# Patient Record
Sex: FEMALE | Race: WHITE | URBAN - METROPOLITAN AREA
[De-identification: names, ages, dates, MRNs, and addresses within clinical notes are randomized per-mention and may not be internally consistent; named-entity substitution may affect disease eponyms.]

---

## 2017-01-05 ENCOUNTER — OFFICE VISIT (OUTPATIENT)
Dept: PSYCHOLOGY | Facility: CLINIC | Age: 22
End: 2017-01-05

## 2017-01-05 DIAGNOSIS — E66.01 PSYCHOLOGICAL FACTORS AFFECTING MORBID OBESITY (H): ICD-10-CM

## 2017-01-05 DIAGNOSIS — F41.1 GENERALIZED ANXIETY DISORDER: Primary | ICD-10-CM

## 2017-01-05 DIAGNOSIS — F54 PSYCHOLOGICAL FACTORS AFFECTING MORBID OBESITY (H): ICD-10-CM

## 2017-01-06 NOTE — PROGRESS NOTES
"Health Psychology                                      Department of Medicine                                           [facility] Mail Code 747    Aimee Massey, Ph.D., L.P. [phone]  [address] [place] [address] Deshawn Kwok, Ph.D.,  L.P. [phone]  [place] [address]  Ronn Cat, Ph.D., EDGAR.BETSY.P.P., L.P. [phone]   ________________________________________________________________________________________________  Health Psychology - Follow up Visit  Confidential Summary*    REFERRAL SOURCE  Aime Fox MD    CHIEF COMPLAINT/REASON FOR VISIT  Cognitive behavioral therapy and behavioral counseling in context of health and behavior issues related to weight loss and upcoming weight loss surgery/gastric sleeve surgery.       Patient was seen today for a 60 minute individual health and behavior intervention session.    Subjective:  Patient seen for individual session.  Todays weight 261.7, a loss of over 5 pounds. Her surgery goal weight is 260 with clothes.  She reported that she has been diligent about recording her dietary intake using \"my plate\".  She reported that she is aware that her portion sizes have been large and that she often ate and then forgot that she had.  She is moving to having 3 oz of meat (size of her palm) and filling her plate with vegetables.  She has had several visits with dietician and reports improved knowledge.      She reported that she and her boyfriend have discussed the possibility that she may receive extra attention following surgery and he reassured her that as long as her behavior does not change, that he will be able to adjust.  Discussed his own weight loss surgery and the fact that he went from 493 down to 250.  He is currently at 270 and is hoping that her focus on weight loss will encourage him to lose further.  He is a  who is often on the road for a week at a time.  He has begun " snacking as he reports having difficulty finding healthy eating on the road.      Patient learned that she will be having her surgery with Dr. Fox, same surgeon her bf had.  She is excited.  Although she was given the option of an earlier date, she is planning to ask that the surgery be done during her academic break in March.  Her bf is going to take the week off and her mom is going to move down and stay with them for the week or longer if necessary.      She endorsed some awareness of emotional eating and reported a chinese food craving right after her bf left for his week long trip.  She reported that she was aware that this was triggered by her emotions of loss at his departure.  She is aware that she will need to identify other coping tools in order to be successful both short term and after surgery.      Patients mother has a brain aneurism when patient age 15, discussed long history of anxiety and worry.      She is hoping to lose 100 pounds with surgery, to get to a healthy weight of 150 for her 5'6 frame.      Discussed assertiveness communication but patients colleague has not been at work.      Objective:  Patient was on time for today s session, appropriately groomed and dressed, and demonstrated good eye contact.  She appeared friendly, alert and oriented.  Mood was euthymic, with appropriate range of affect. Patient denied suicidal or assaultive ideation, plan, or intent.        Assessment:  The patient has a longstanding history of obesity.  She continues to prepare herself for bariatric surgery.  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      Plan:  With continued guidance and education from her health care team, there are no major concerns from a psychological standpoint, and patient is probably a good candidate for weight loss surgery. Will see in 1 week for continued support and education surrounding weight loss and  anxiety management strategies.    Time In: 10:00  Time Out:  11:00    Diagnosis:  Axis I ROGERIO, psych factors affecting obesity    Axis II Deferred   Axis III Obesity (278.00), please see medical records for details   [place] IV Psychosocial and Environmental Stressors:Health & school     Yuliya Staton, Ph.D., L.P.      *In accordance with the Rules of the [place] [employer], it is noted that psychological descriptions and scientific procedures underlying psychological evaluations have limitations.  Absolute predictions cannot be made based on information in this report.

## 2017-01-26 ENCOUNTER — OFFICE VISIT (OUTPATIENT)
Dept: PSYCHOLOGY | Facility: CLINIC | Age: 22
End: 2017-01-26

## 2017-01-26 ENCOUNTER — OFFICE VISIT (OUTPATIENT)
Dept: SURGERY | Facility: CLINIC | Age: 22
End: 2017-01-26

## 2017-01-26 VITALS
HEART RATE: 74 BPM | SYSTOLIC BLOOD PRESSURE: 134 MMHG | DIASTOLIC BLOOD PRESSURE: 87 MMHG | OXYGEN SATURATION: 99 % | TEMPERATURE: 98.5 F | BODY MASS INDEX: 41.53 KG/M2 | HEIGHT: 66 IN | WEIGHT: 258.4 LBS

## 2017-01-26 DIAGNOSIS — E66.01 PSYCHOLOGICAL FACTORS AFFECTING MORBID OBESITY (H): Primary | ICD-10-CM

## 2017-01-26 DIAGNOSIS — F54 PSYCHOLOGICAL FACTORS AFFECTING MORBID OBESITY (H): Primary | ICD-10-CM

## 2017-01-26 DIAGNOSIS — E66.01 MORBID OBESITY DUE TO EXCESS CALORIES (H): Primary | ICD-10-CM

## 2017-01-26 DIAGNOSIS — F41.1 GAD (GENERALIZED ANXIETY DISORDER): ICD-10-CM

## 2017-01-26 ASSESSMENT — ENCOUNTER SYMPTOMS
EXERCISE INTOLERANCE: 0
SNORES LOUDLY: 0
SPEECH CHANGE: 0
SYNCOPE: 0
WEIGHT GAIN: 0
HYPOTENSION: 0
DECREASED LIBIDO: 0
SHORTNESS OF BREATH: 0
DECREASED APPETITE: 0
NIGHT SWEATS: 0
HYPERTENSION: 0
TASTE DISTURBANCE: 0
LOSS OF CONSCIOUSNESS: 0
WEIGHT LOSS: 0
SPUTUM PRODUCTION: 0
PARALYSIS: 0
SORE THROAT: 0
CLAUDICATION: 0
DIZZINESS: 0
DYSPNEA ON EXERTION: 0
FEVER: 0
SLEEP DISTURBANCES DUE TO BREATHING: 0
LIGHT-HEADEDNESS: 0
COUGH DISTURBING SLEEP: 0
SINUS PAIN: 0
HEMATURIA: 0
EYE REDNESS: 0
TINGLING: 0
DYSURIA: 0
LEG PAIN: 0
INCREASED ENERGY: 0
WEAKNESS: 0
NAIL CHANGES: 0
ALTERED TEMPERATURE REGULATION: 0
NUMBNESS: 0
SKIN CHANGES: 0
HALLUCINATIONS: 0
DISTURBANCES IN COORDINATION: 0
EYE PAIN: 0
TROUBLE SWALLOWING: 0
ORTHOPNEA: 0
PALPITATIONS: 0
SMELL DISTURBANCE: 0
DIFFICULTY URINATING: 0
POLYDIPSIA: 0
DOUBLE VISION: 0
HEMOPTYSIS: 0
HEADACHES: 0
WHEEZING: 0
COUGH: 0
SINUS CONGESTION: 0
EYE IRRITATION: 0
CHILLS: 0
EXTREMITY NUMBNESS: 0
TACHYCARDIA: 0
NECK MASS: 0
LEG SWELLING: 0
POLYPHAGIA: 0
HOT FLASHES: 0
POSTURAL DYSPNEA: 0
FATIGUE: 0
HOARSE VOICE: 0
FLANK PAIN: 0
EYE WATERING: 0
SEIZURES: 0
POOR WOUND HEALING: 0
MEMORY LOSS: 0
RESPIRATORY PAIN: 0
TREMORS: 0

## 2017-01-26 NOTE — NURSING NOTE
"(   Chief Complaint   Patient presents with     RECHECK     Needs pre op teaching Goal 260 #    )    ( Weight: 258 lb 6.4 oz )  ( Height: 5' 5.5\" )  ( BMI (Calculated): 42.43 )  ( Seminar Weight: 270 lb 3.2 oz )  ( Seminar Wt Minus Current Wt (lbs): 11.8 )  ( Last Visits Weight: 270 lb 3.2 oz )  ( Change from Last Visit Weight (lbs): -11.8 )  (   )  (   )    ( BP: 134/87 mmHg )  ( Site: Arm, upper left )  ( Temp: 98.5  F (36.9  C) )  ( Temp src: Oral )  ( Pulse: 74 )  (   )  ( SpO2: 99 % )    (   Patient Active Problem List   Diagnosis     Obesity     Elevated blood pressure reading without diagnosis of hypertension     Mild major depression (H)     History of methamphetamine abuse     Anxiety     Back pain    )  (   Current Outpatient Prescriptions   Medication Sig Dispense Refill     norgestim-eth estrad triphasic (TRINESSA, 28,) 0.18/0.215/0.25 MG-35 MCG TABS tablet Take 1 tablet by mouth daily 3 Package 3    )  ( Diabetes Eval:    )    ( Pain Eval:  Data Unavailable )    ( Wound Eval:       )    (   History   Smoking status     Never Smoker    Smokeless tobacco     Never Used    )    ( Signed By:  Fernando Evans; January 26, 2017; 2:07 PM )    "

## 2017-01-26 NOTE — Clinical Note
2017       RE: Agatha Alejandro  [address] [place] [id number]     Dear Colleague,    Thank you for referring your patient, Agatha Alejandro, to the [facility] SURGICAL [facility]NA[facility]EMENT at [facility]. Please see a copy of my visit note below.          Pre-Bariatric Surgery Note    Makayla Campos    Date: 2017     RE: Agatha Alejandro    MR#: [id number]   : 1995   Date of Visit: 2017    REASON FOR VISIT: Preoperative evaluation for possible weight loss surgery    Dear Dr. Campos,    I had the pleasure of seeing your patient, Agatha Alejandro, in my preoperative bariatric clinic.    As you know, she is morbidly obese and considering weight loss surgery to treat obesity in association with her medical conditions of obesity.  Her consult weight was 270.  She has lost 13 pounds since her consult weight. She has met her required pre-surgery weight.    Most recent weights:  Wt Readings from Last 4 Encounters:   17 258 lb 6.4 oz   16 267 lb 9.6 oz   16 270 lb 3.2 oz   13 255 lb 4.7 oz (99.48 %*)     * Growth percentiles are based on CDC 2-20 Years data.       Please refer to initial consult note from date 16 for patient's weight history and co-morbidities.    Review of Systems     Constitutional:  Negative for fever, chills, weight loss, weight gain, fatigue, decreased appetite, night sweats, recent stressors, height gain, height loss, post-operative complications, incisional pain, hallucinations, increased energy, hyperactivity and confused.   HENT:  Negative for ear pain, hearing loss, tinnitus, nosebleeds, trouble swallowing, hoarse voice, mouth sores, sore throat, ear discharge, tooth pain, gum tenderness, taste disturbance, smell disturbance, hearing aid, bleeding gums, dry mouth, sinus pain, sinus congestion and neck mass.    Eyes:  Negative for double vision, pain, redness, eye pain, decreased vision, eye watering,  eye bulging, eye dryness, flashing lights, spots, floaters, strabismus, tunnel vision, jaundice and eye irritation.   Respiratory:   Negative for cough, hemoptysis, sputum production, shortness of breath, wheezing, sleep disturbances due to breathing, snores loudly, respiratory pain, dyspnea on exertion, cough disturbing sleep and postural dyspnea.    Cardiovascular:  Negative for chest pain, dyspnea on exertion, palpitations, orthopnea, claudication, leg swelling, fingers/toes turn blue, hypertension, hypotension, syncope, history of heart murmur, chest pain on exertion, chest pain at rest, pacemaker, few scattered varicosities, leg pain, sleep disturbances due to breathing, tachycardia, light-headedness, exercise intolerance and edema.   Genitourinary:  Negative for bladder incontinence, dysuria, urgency, hematuria, flank pain, vaginal discharge, difficulty urinating, genital sores, dyspareunia, decreased libido, nocturia, voiding less frequently, arousal difficulty, abnormal vaginal bleeding, excessive menstruation, menstrual changes, hot flashes, vaginal dryness and postmenopausal bleeding.   Skin:  Negative for nail changes, itching, poor wound healing, rash, hair changes, skin changes, acne, warts, poor wound healing, scarring, flaky skin, Raynaud's phenomenon, sensitivity to sunlight and skin thickening.   Neurological:  Negative for dizziness, tingling, tremors, speech change, seizures, loss of consciousness, weakness, light-headedness, numbness, headaches, disturbances in coordination, extremity numbness, memory loss, difficulty walking and paralysis.   Psychiatric/Behavioral:  Negative for hallucinations and memory loss.    Endocrine:  Negative for altered temperature regulation, polyphagia, polydipsia, unwanted hair growth and change in facial hair.      Past Medical History   Diagnosis Date     History of methamphetamine abuse 2013     3 weeks of use.  Went to rehab       Past Surgical History  "  Procedure Laterality Date     Tonsillectomy  age [age]     C dental bitewing single film  age 14     wisdom teeth     Rw skeletal/muscul (abstracted)  age 12     left knee:  ACL and oher tendon surgery       Current Outpatient Prescriptions   Medication     norgestim-eth estrad triphasic (TRINESSA, 28,) 0.18/0.215/0.25 MG-35 MCG TABS tablet     No current facility-administered medications for this visit.       Allergies   Allergen Reactions     Penicillins Hives       PHYSICAL EXAMINATION:  /87 mmHg  Pulse 74  Temp(Src) 98.5  F (36.9  C) (Oral)  Ht 5' 5.5\"  Wt 258 lb 6.4 oz  BMI 42.33 kg/m2  SpO2 99%   Body mass index is 42.33 kg/(m^2).   NAD NCAT  Respiratory: breathing unlabored  Abdomen: obese, soft/nt/nd    Special testing: not indicated    We reviewed the choice of surgery and she would like to undergo laparoscopic sleeve gastrectomy surgery.    We reviewed the risks of surgery related to the procedure.    Sleeve Gastrectomy: Risks and Side Effects    The complications or risks of surgery include but are not limited to: death, heart attack, infection in the surgical site (wound infection), abdomen (abscess), bladder (urinary tract infection), lungs (pneumonia), clots in legs (deep vein thrombosis) or lungs (pulmonary emboli),  injury to the bowels or other organs, bowel obstruction, hernia at the incision and gastrointestional bleeding.    More specific risks related to vertical sleeve gastrectomy were detailed at the bariatric informational seminar and include the following: leak at the vertical sleeve staple line, leak at the anastomoses,  nausea, vomiting, and dehydration for several months,  adhesions causing bowel obstruction, rapid weight loss causing a higher rate of gallstone formation during the first 6 months after surgery, decreased absorption of vitamins and protein because of the reduced stomach size, weight regain if inappropriate food intake occurs, stricture, injury to other organs, " hernia,  and ulcers.       Side effects of bariatric surgery include but are not limited to: abdominal pain, cramping, bloating, constipation, nausea, vomiting, diarrhea, difficulty swallowing,  dehydration, hair loss, excess skin, protein, iron and vitamin deficiencies, heartburn, transfer of addictions, increased anxiety and worsening depression.       I emphasized exercise and activity behavior along with appropriate food choice as the main foundation for weight loss with surgery providing surgical reinforcement of the appropriate behavior set.    PLAN:  Will plan for laparoscopic sleeve gastrectomy for this patient to be schedule based on availability. The patient has expressed understanding with the risks and benefits and has completed the appropriate steps pre-operatively.     Review of general surgery weight loss process    1. Complete preoperative requirements, including weight loss.  Final weight check to confirm MANDATORY weight loss requirement must be documented on a clinic scale.    2. Discuss prior authorization with .    3. History and physical evaluation by PCP of PAC clinic within 30 days of surgery date, preoperative class, and weight check (weigh-in visit) to be scheduled by patient.  Pre-anesthesia clinic for risk evaluation to be scheduled by anesthesia clinic.    4. We cannot guarantee that patient will qualify for surgery unless all preoperative requirements are met, prior authorization from primary insurance company is granted, and insurance changes do not occur.    5. It is possible for patients to regain all weight after weight loss surgery unless they follow guidelines prescribed by our bariatric center.    6. All patients with gastrointestinal complaints after weight loss surgery must have complaints conveyed to the bariatric team for appropriate treatment.    7. Vitamin deficiencies may develop post-bariatric surgery and annual laboratory testing should be  performed.    8. Persistent nausea/vomiting after bariatric surgery entails risk of thiamine deficiency and should be treated early.  Vitamin B12 deficiency may develop, especially after gastric bypass surgery and must be recognized.        If you have any questions about our plans please don't hesitate to contact me.    Sincerely,    Aime Fox MD  Surgery  [phone] (hospital )  [phone] (clinic nurses)    I spent a total of 30 minutes face to face with Agatha Alejandro during today's office visit.  Over 50% of this time was spent counseling the patient and/or coordinating care.

## 2017-01-26 NOTE — PROGRESS NOTES
Pre-Bariatric Surgery Note    Makayla Campos    Date: 2017     RE: Agatha Alejandro    MR#: [id number]   : 1995   Date of Visit: 2017    REASON FOR VISIT: Preoperative evaluation for possible weight loss surgery    Dear Dr. Campos,    I had the pleasure of seeing your patient, Agatha Alejandro, in my preoperative bariatric clinic.    As you know, she is morbidly obese and considering weight loss surgery to treat obesity in association with her medical conditions of obesity.  Her consult weight was 270.  She has lost 13 pounds since her consult weight. She has met her required pre-surgery weight.    Most recent weights:  Wt Readings from Last 4 Encounters:   17 258 lb 6.4 oz   16 267 lb 9.6 oz   16 270 lb 3.2 oz   13 255 lb 4.7 oz (99.48 %*)     * Growth percentiles are based on [facility] 2-20 Years data.       Please refer to initial consult note from date 16 for patient's weight history and co-morbidities.    Review of Systems     Constitutional:  Negative for fever, chills, weight loss, weight gain, fatigue, decreased appetite, night sweats, recent stressors, height gain, height loss, post-operative complications, incisional pain, hallucinations, increased energy, hyperactivity and confused.   HENT:  Negative for ear pain, hearing loss, tinnitus, nosebleeds, trouble swallowing, hoarse voice, mouth sores, sore throat, ear discharge, tooth pain, gum tenderness, taste disturbance, smell disturbance, hearing aid, bleeding gums, dry mouth, sinus pain, sinus congestion and neck mass.    Eyes:  Negative for double vision, pain, redness, eye pain, decreased vision, eye watering, eye bulging, eye dryness, flashing lights, spots, floaters, strabismus, tunnel vision, jaundice and eye irritation.   Respiratory:   Negative for cough, hemoptysis, sputum production, shortness of breath, wheezing, sleep disturbances due to breathing, snores loudly, respiratory pain, dyspnea on  exertion, cough disturbing sleep and postural dyspnea.    Cardiovascular:  Negative for chest pain, dyspnea on exertion, palpitations, orthopnea, claudication, leg swelling, fingers/toes turn blue, hypertension, hypotension, syncope, history of heart murmur, chest pain on exertion, chest pain at rest, pacemaker, few scattered varicosities, leg pain, sleep disturbances due to breathing, tachycardia, light-headedness, exercise intolerance and edema.   Genitourinary:  Negative for bladder incontinence, dysuria, urgency, hematuria, flank pain, vaginal discharge, difficulty urinating, genital sores, dyspareunia, decreased libido, nocturia, voiding less frequently, arousal difficulty, abnormal vaginal bleeding, excessive menstruation, menstrual changes, hot flashes, vaginal dryness and postmenopausal bleeding.   Skin:  Negative for nail changes, itching, poor wound healing, rash, hair changes, skin changes, acne, warts, poor wound healing, scarring, flaky skin, Raynaud's phenomenon, sensitivity to sunlight and skin thickening.   Neurological:  Negative for dizziness, tingling, tremors, speech change, seizures, loss of consciousness, weakness, light-headedness, numbness, headaches, disturbances in coordination, extremity numbness, memory loss, difficulty walking and paralysis.   Psychiatric/Behavioral:  Negative for hallucinations and memory loss.    Endocrine:  Negative for altered temperature regulation, polyphagia, polydipsia, unwanted hair growth and change in facial hair.      Past Medical History   Diagnosis Date     History of methamphetamine abuse 2013     3 weeks of use.  Went to rehab       Past Surgical History   Procedure Laterality Date     Tonsillectomy  age [age]     C dental bitewing single film  age 14     wisdom teeth     Rw skeletal/muscul (abstracted)  age 12     left knee:  ACL and oher tendon surgery       Current Outpatient Prescriptions   Medication     norgestim-eth estrad triphasic (TRINESSA, 28,)  "0.18/0.215/0.25 MG-35 MCG TABS tablet     No current facility-administered medications for this visit.       Allergies   Allergen Reactions     Penicillins Hives       PHYSICAL EXAMINATION:  /87 mmHg  Pulse 74  Temp(Src) 98.5  F (36.9  C) (Oral)  Ht 5' 5.5\"  Wt 258 lb 6.4 oz  BMI 42.33 kg/m2  SpO2 99%   Body mass index is 42.33 kg/(m^2).   NAD NCAT  Respiratory: breathing unlabored  Abdomen: obese, soft/nt/nd    Special testing: not indicated    We reviewed the choice of surgery and she would like to undergo laparoscopic sleeve gastrectomy surgery.    We reviewed the risks of surgery related to the procedure.    Sleeve Gastrectomy: Risks and Side Effects    The complications or risks of surgery include but are not limited to: death, heart attack, infection in the surgical site (wound infection), abdomen (abscess), bladder (urinary tract infection), lungs (pneumonia), clots in legs (deep vein thrombosis) or lungs (pulmonary emboli),  injury to the bowels or other organs, bowel obstruction, hernia at the incision and gastrointestional bleeding.    More specific risks related to vertical sleeve gastrectomy were detailed at the bariatric informational seminar and include the following: leak at the vertical sleeve staple line, leak at the anastomoses,  nausea, vomiting, and dehydration for several months,  adhesions causing bowel obstruction, rapid weight loss causing a higher rate of gallstone formation during the first 6 months after surgery, decreased absorption of vitamins and protein because of the reduced stomach size, weight regain if inappropriate food intake occurs, stricture, injury to other organs, hernia,  and ulcers.       Side effects of bariatric surgery include but are not limited to: abdominal pain, cramping, bloating, constipation, nausea, vomiting, diarrhea, difficulty swallowing,  dehydration, hair loss, excess skin, protein, iron and vitamin deficiencies, heartburn, transfer of " addictions, increased anxiety and worsening depression.       I emphasized exercise and activity behavior along with appropriate food choice as the main foundation for weight loss with surgery providing surgical reinforcement of the appropriate behavior set.    PLAN:  Will plan for laparoscopic sleeve gastrectomy for this patient to be schedule based on availability. The patient has expressed understanding with the risks and benefits and has completed the appropriate steps pre-operatively.     Review of general surgery weight loss process    1. Complete preoperative requirements, including weight loss.  Final weight check to confirm MANDATORY weight loss requirement must be documented on a clinic scale.    2. Discuss prior authorization with .    3. History and physical evaluation by PCP of PAC clinic within 30 days of surgery date, preoperative class, and weight check (weigh-in visit) to be scheduled by patient.  Pre-anesthesia clinic for risk evaluation to be scheduled by anesthesia clinic.    4. We cannot guarantee that patient will qualify for surgery unless all preoperative requirements are met, prior authorization from primary insurance company is granted, and insurance changes do not occur.    5. It is possible for patients to regain all weight after weight loss surgery unless they follow guidelines prescribed by our bariatric center.    6. All patients with gastrointestinal complaints after weight loss surgery must have complaints conveyed to the bariatric team for appropriate treatment.    7. Vitamin deficiencies may develop post-bariatric surgery and annual laboratory testing should be performed.    8. Persistent nausea/vomiting after bariatric surgery entails risk of thiamine deficiency and should be treated early.  Vitamin B12 deficiency may develop, especially after gastric bypass surgery and must be recognized.        If you have any questions about our plans please don't hesitate  to contact me.    Sincerely,    Aime Fox MD  Surgery  [phone] (hospital )  [phone] (clinic nurses)                  I spent a total of 30 minutes face to face with Agatha Alejandro during today's office visit.  Over 50% of this time was spent counseling the patient and/or coordinating care.

## 2017-01-26 NOTE — NURSING NOTE
This patient is having Lap Sleeve Gastrectomy by Dr. Fox.    The following handouts were reviewed with the patient :  Before Your Surgery, Patient Checklist, Weight Loss Surgery Pre-operative Class, Preop Recommendations Quick Reference Guide, History and Physical, Blood Bank Preadmission Order, Medications to Avoid, Shower or Bathing Before Surgery, Bowel Preparation, Powerful Choices and [place] [employer] Directive.  Questions were addressed and understanding of content was verbalized.  Contact information was provided.    Patient goal weight: 260 #  Weight today: 258 #  Class: Done  PAC: TBD  RD: To do Monday  H&P: TBD  Labs: TBD    Time with pt - 15 min    Megan Balderas RN

## 2017-01-30 ENCOUNTER — ALLIED HEALTH/NURSE VISIT (OUTPATIENT)
Dept: SURGERY | Facility: CLINIC | Age: 22
End: 2017-01-30

## 2017-01-30 NOTE — PROGRESS NOTES
"Bariatric Nutrition Consultation Note    Reason For Visit: Nutrition Reassessment    Agatha Alejandro is a 21 year old presenting today for a follow-up bariatric nutrition consult.  Pt is interested in laparoscopic sleeve gastrectomy.  This is pt's 3rd of 3 RD visits required. Pt referred by KE Pitt.        Support System Reviewed With Patient  11/8/2016    Do you feel you have adequate support from family and/or friends before and after surgery?  No    *Update (1/30/17): Pt stated today that she does indeed have the support of her family, boyfriend, and friends.       ANTHROPOMETRICS:  Initial Estimated body mass index is 46.70 kg/(m^2) as calculated from the following:    Height as of 8/30/13: 1.62 m (5' 3.78\").    Initial Weight as of an earlier encounter on 11/18/16: 270.2 lbs  Current weight: 259.3 lbs *Goal Met/Surpassed*   Weight Change: -8.3 lbs over the past month; - 10.9 lbs from initial weight    Required weight loss goal pre-op: -10 lbs from initial consult weight (goal weight 260.2 lbs or less before surgery)     NUTRITION HISTORY:  Food Allergies/Intolerances: none.  Cravings: Salty foods (soy sauce); chinese foods.  Triggers/cues causing extra eating: stress  Supplementation: MVI daily    Progress With Previous Goals:  Relating To Eating: - Pt has been following the modified liquid diet 75% of the time.  She did have pizza and cake at a birthday party, but went right back to the diet afterwards.   -Try the Modified Liquid Diet for weight loss:  Breakfast: Protein Shake  Lunch: Protein Shake  Supper: 3 oz lean protein + non-starchy vegetables  Snack: non-starchy vegetables (no calorie-containing dips/condiments)  Beverages: at least 48-64 oz water between meals daily  *Protein Shake Criteria: ~200 Calories, at least 20 grams of protein, and less than 10 grams of sugar    -Eat slowly (20-30 minutes per meal), chewing foods well (25 chews per bite/applesauce consistency).- Meeting.   - 9\" Plate " method (1/2 plate non-starchy vegetables/fruit, 1/4 plate lean protein, 1/4 plate whole grain starch - no more than 1/2 cup carb/meal) and Focus on lean protein and non-starchy vegetables/whole fruit at each meal with no more than 1 cup of carbs or 2 slices of bread- Meeting.     Relating to beverages:  - Continue separate fluids from meals by 30 minutes before, during, and after eating.- Meeting.   - Continue drinking at least 64oz of water daily. - Meeting.     Relating to dietary supplements:  - Continue taking a multivitamin containing iron daily.- Meeting.     Relating to activity:  - Continue walking for 30-45 minutes 3 days/week. - Meeting.         NUTRITION DIAGNOSIS:  Previous (continues): Obesity r/t long history of self-monitoring deficit and excessive energy intake aeb BMI >30. - continues, improving.  New added: Food- and Nutrition-related knowledge deficit r/t lack of prior exposure to information AEB pt unable to verbalize main points of bariatric clear and low-fat full liquid diet.    INTERVENTION:  Intervention Provided/Education Provided:  Praised Pt on weight lost.  Reviewed goals. Encouraged Pt to continue the Modified Liquid Diet for rapid weight loss.  Provided instruction on bariatric clear and low-fat full liquid diets.  Provided the following handouts: Diet Guidelines for Bariatric Surgery, Sources of Protein, Keeping Track of Your Fluids, list of recommended vitamin/mineral supplementation after SG surgery and RD contact information.   Patient Understanding: good  Expected Compliance: good       PRE-OP GOALS:  Relating To Eating:  -Try the Modified Liquid Diet for weight loss:  Breakfast: Protein Shake  Lunch: Protein Shake  Supper: 3 oz lean protein + non-starchy vegetables  Snack: non-starchy vegetables (no calorie-containing dips/condiments)  Beverages: at least 48-64 oz water between meals daily  *Protein Shake Criteria: ~200 Calories, at least 20 grams of protein, and less than 10  "grams of sugar    -Eat slowly (20-30 minutes per meal), chewing foods well (25 chews per bite/applesauce consistency).  - 9\" Plate method (1/2 plate non-starchy vegetables/fruit, 1/4 plate lean protein, 1/4 plate whole grain starch - no more than 1/2 cup carb/meal) and Focus on lean protein and non-starchy vegetables/whole fruit at each meal with no more than 1 cup of carbs or 2 slices of bread    Relating to beverages:  - Continue separate fluids from meals by 30 minutes before, during, and after eating.  - Continue drinking at least 64oz of water daily.     Relating to dietary supplements:  - Continue taking a multivitamin containing iron daily.    Relating to activity:  - Continue walking for 30-45 minutes 3 days/week.     POST-OP GOALS  1. Follow the bariatric post-op diet advancement schedule:  - Bariatric clear liquid diet through post-op day 1 (while in the hospital).  - Bariatric low-fat full liquid diet on post-op days 2 through 13 (2 weeks).  2. Sip on 48-64 oz (or greater) fluids daily, recording intake to help stay on-track.  - Drink at least 2 oz of fluid every 30 min.    Follow-Up: 1 month or PRN    Time spent with patient: 30 minutes.    Lilo Rico, RD, LD, CLT  Pager: [phone]            "

## 2017-01-30 NOTE — PROGRESS NOTES
Health Psychology                                      Department of Medicine                                           [facility] Mail Code 741    Aimee Massey, Ph.D., L.P. [phone]  [address] [place] [address], [facility] Deshawn Kwok, Ph.D.,  L.P. [phone]  [place] [address]  Ronn Cat, Ph.D., A.B.P.P., L.P. [phone]       Yuliya Staton, PhD, LP [phone]  ________________________________________________________________________________________________  Health Psychology - Follow up Visit  Confidential Summary*    REFERRAL SOURCE  Aime Fox MD    CHIEF COMPLAINT/REASON FOR VISIT  Cognitive behavioral therapy and behavioral counseling in context of health and behavior issues related to weight loss and upcoming weight loss surgery/gastric sleeve surgery.       Patient was seen today for a 60 minute individual health and behavior intervention session.    TX PLAN FINISHED AND SIGNED TODAY    Subjective:  Patient seen for individual session.  Todays weight 257, a loss of aporoximately 5 pounds since last session and 13 pounds overall. She reported that she has recently changed her work hours and is finding that working more consistent hours is helpful in planning her meals.  She needs to wake up by 4 in order to complete her morning routine and be at work at 6.  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  She is currently paying off school and is hoping that she will be admitted into a nursing program via [employer].  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  She reported that she has been recommended for a program in which [employer] will pay for her school expenses.      She has told both of her parents about her intention to have bariatric surgery and reported that they were appropriately concerned but supportive.  She also informed  "them that she realizes that she learned a lot of negative eating patterns from them and is aware that they consume high calorie foods and large portions.  She also described her mothers long history of possible alcohol dependence and the impact her behaviors had on the patient.  She was able to see that her behaviors are parentified and she was confronted on her efforts to change her mother.  Her mom is soon to move in with she and her boyfriend and the patient is hoping that she might help her to lose weight, get more active, drink less and engage more with the patient.  Encouraged her to consider [employer] or [facility] programs to learn more about dynamics of caring for someone else who uses alcohol.      The patient has been having a shake in the morning, yogurt and granola and fruit at lunch and veggies and chicken in the evening.  Her boyfriend also had the sleeve procedure and has gained some weight back.  She reported that he is excited that he will now lose with her.  He is aware that she would like to eat out less and is agreeable to this.  The patient reported that she is enjoying cooking at home and feels that she and boyfriend have more time together when they do not go out for meals.      While in session, she pulled up \"My Plate\" and reported that her daily caloric intake ranges from 1040 - 1903 and tends to be higher when by (Bran) is home.      Discussed plans for a March surgery and has started making plans with her employer to be off for 6 weeks.  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      Objective:  Patient was on time for today s session, appropriately groomed and dressed, and demonstrated good eye contact.  She appeared friendly, alert and oriented.  Mood was euthymic, with appropriate range of affect. Patient denied suicidal or assaultive ideation, plan, or intent.        Assessment:  It appears that patient has consistently made changes in her diet " "and exercise and is aware of emotional eating.  She is \"all clear\" from a psychological perspective.  Encouraged patient to continue to participate in counseling through the surgery and following to improve weight loss outcomes and emotional adjustment.      The patient has a longstanding history of obesity.  She continues to prepare herself for bariatric surgery.      Plan:  With continued guidance and education from her health care team, there are no major concerns from a psychological standpoint, and patient is probably a good candidate for weight loss surgery. Will see in 2 weeks for continued support and education surrounding weight loss and anxiety management strategies.    Time In: 12:00  Time Out:  1:00    Diagnosis:  Axis I ROGERIO, psych factors affecting obesity    Axis II Deferred   Axis III Obesity (278.00), please see medical records for details   [place] IV Psychosocial and Environmental Stressors:Health & school     Yuliya Staton, Ph.D., L.P.      *In accordance with the Rules of the [place] Board of Psychology, it is noted that psychological descriptions and scientific procedures underlying psychological evaluations have limitations.  Absolute predictions cannot be made based on information in this report.       "

## 2017-02-16 ENCOUNTER — ANESTHESIA EVENT (OUTPATIENT)
Dept: SURGERY | Facility: CLINIC | Age: 22
DRG: 621 | End: 2017-02-16
Payer: COMMERCIAL

## 2017-02-17 ENCOUNTER — OFFICE VISIT (OUTPATIENT)
Dept: SURGERY | Facility: CLINIC | Age: 22
End: 2017-02-17

## 2017-02-17 ENCOUNTER — ALLIED HEALTH/NURSE VISIT (OUTPATIENT)
Dept: SURGERY | Facility: CLINIC | Age: 22
End: 2017-02-17

## 2017-02-17 VITALS
RESPIRATION RATE: 16 BRPM | WEIGHT: 259.9 LBS | SYSTOLIC BLOOD PRESSURE: 135 MMHG | DIASTOLIC BLOOD PRESSURE: 82 MMHG | HEART RATE: 73 BPM | TEMPERATURE: 98.2 F | HEIGHT: 66 IN | BODY MASS INDEX: 41.77 KG/M2 | OXYGEN SATURATION: 98 %

## 2017-02-17 DIAGNOSIS — Z98.84 BARIATRIC SURGERY STATUS: ICD-10-CM

## 2017-02-17 DIAGNOSIS — Z98.84 BARIATRIC SURGERY STATUS: Primary | ICD-10-CM

## 2017-02-17 LAB
ALBUMIN UR-MCNC: NEGATIVE MG/DL
APPEARANCE UR: CLEAR
BILIRUB UR QL STRIP: NEGATIVE
COLOR UR AUTO: YELLOW
GLUCOSE UR STRIP-MCNC: NEGATIVE MG/DL
HGB BLD-MCNC: 13.1 G/DL (ref 11.7–15.7)
HGB UR QL STRIP: ABNORMAL
KETONES UR STRIP-MCNC: NEGATIVE MG/DL
LEUKOCYTE ESTERASE UR QL STRIP: NEGATIVE
MUCOUS THREADS #/AREA URNS LPF: PRESENT /LPF
NITRATE UR QL: NEGATIVE
PH UR STRIP: 5 PH (ref 5–7)
RBC #/AREA URNS AUTO: 2 /HPF (ref 0–2)
SP GR UR STRIP: 1.02 (ref 1–1.03)
SQUAMOUS #/AREA URNS AUTO: <1 /HPF (ref 0–1)
TSH SERPL DL<=0.005 MIU/L-ACNC: 0.93 MU/L (ref 0.4–4)
URN SPEC COLLECT METH UR: ABNORMAL
UROBILINOGEN UR STRIP-MCNC: 0 MG/DL (ref 0–2)
WBC #/AREA URNS AUTO: <1 /HPF (ref 0–2)

## 2017-02-17 ASSESSMENT — LIFESTYLE VARIABLES: TOBACCO_USE: 0

## 2017-02-17 NOTE — H&P
Pre-Operative H & P     CC:  Preoperative exam to assess for increased cardiopulmonary risk while undergoing surgery and anesthesia.    Date of Encounter: 2/17/2017  Primary Care Physician:  Makayla Campos Flavia is a 21 year old female who presents for pre-operative H & P in preparation for  Laparoscopic Sleeve Gastrectomy on 2/17/17 by Dr. Fox in treatment of  Morbid obesity. Surgery  at [facility]. History of morbid obesity.  Desires weight loss.  Met goal weight.     Remote anesthesia.  Can't recall details.    No family history of bleeding, clotting disorders or complications with anesthesia.      History is obtained from the patient and electronic health record.     Past Medical History  Past Medical History   Diagnosis Date     Depression      History of methamphetamine abuse 2013     3 weeks of use.  Went to rehab     Morbid obesity (H)        Past Surgical History  Past Surgical History   Procedure Laterality Date     Tonsillectomy  age [age]     C dental bitewing single film  age 14     wisdom teeth     Rw skeletal/muscul (abstracted)  age 12     left knee:  ACL and oher tendon surgery       Hx of Blood transfusions/reactions: no     Hx of abnormal bleeding or anti-platelet use: no    Menstrual history: On menses    Steroid use in the last year: no    Personal or FH with difficulty with Anesthesia:  no    Prior to Admission Medications  Current Outpatient Prescriptions   Medication Sig Dispense Refill     Multiple Vitamins-Minerals (MULTIVITAMIN ADULT PO) Take by mouth every morning       norgestim-eth estrad triphasic (TRINESSA, 28,) 0.18/0.215/0.25 MG-35 MCG TABS tablet Take 1 tablet by mouth daily (Patient taking differently: Take 1 tablet by mouth every morning ) 3 Package 3       Allergies  Allergies   Allergen Reactions     Penicillins Hives       Social History  Social History     Social History     Marital status: Single     Spouse name:  N/A     Number of children: 0     Years of education: 10     Occupational History      [employer]     Social History Main Topics     Smoking status: Never Smoker     Smokeless tobacco: Never Used     Alcohol use No     Drug use: Yes     Special: Methamphetamines      Comment: Meth use in past age [age]     Sexual activity: Yes     Partners: Male     Birth control/ protection: Pill      Comment: Tested 8/19/12      Other Topics Concern     Blood Transfusions No     Caffeine Concern No     1 can pop per day     Hobby Hazards Yes     hunting with protective clothing, basketball - mouthgard, atv with helmet     Sleep Concern No     Stress Concern No     Weight Concern Yes     would like to lose back to 170 lbs     Special Diet Yes     eating less,     Exercise Yes     starting to get physical again, once per week, walking     Bike Helmet No     bicycle     Seat Belt Yes     Self-Exams Not Asked     breast no, skin no, does not wear sunscreen     Social History Narrative    Works at [employer].    Single.  Attends school.  Wants to be a nurse.     Lives with boyfriend    No children    3 brothers - healthy    Parents - mother with history of brain aneurysm    Father with hypertension    No family history of bleeding, clotting disorders or complications with anesthesia.           Family History  Family History   Problem Relation Age of Onset     HEART DISEASE Mother 48     brain anerysm     Hypertension Father      CEREBROVASCULAR DISEASE Maternal Grandmother      CANCER Maternal Grandmother 64     lung     DIABETES Maternal Grandfather      CANCER Maternal Grandfather 76     all over     CANCER Maternal Aunt 48     brain     Lipids No family hx of      Respiratory No family hx of      Thyroid Disease No family hx of        ROS/MED HX  The complete review of systems is negative other than noted in the HPI or here.     ENT/Pulmonary:     (+)AMAN risk factors hypertension, obese, , . .   (-) tobacco use  "  Neurologic:  - neg neurologic ROS     Cardiovascular:     (+) hypertension-range: not on b/p meds, ---. : . . . :. . No previous cardiac testing      (-) CAD, taking anticoagulants/antiplatelets and EASTON   METS/Exercise Tolerance: Comment: 3-4 days per week.  Walks or lifts weights.  Can walk 1-3 miles.   >4 METS   Hematologic:  - neg hematologic  ROS       Musculoskeletal:  - neg musculoskeletal ROS       GI/Hepatic:     (+) GERD Symptomatic,       Renal/Genitourinary:  - ROS Renal section negative       Endo:     (+) Obesity, .      Psychiatric:     (+) psychiatric history depression      Infectious Disease:  - neg infectious disease ROS       Malignancy:      - no malignancy   Other:           Temp: 98.2  F (36.8  C) Temp src: Oral BP: 135/82 Pulse: 73   Resp: 16 SpO2: 98 %         259 lbs 14.4 oz  5' 6\"   Body mass index is 41.95 kg/(m^2).       Physical Exam  Constitutional: Awake, alert, cooperative, no apparent distress, and appears stated age.  Eyes: Pupils equal, round and reactive to light, sclera clear, conjunctiva normal.  HENT: Normocephalic, oral pharynx with moist mucus membranes, good dentition. + thyroidmegaly  Respiratory: Clear to auscultation bilaterally, no crackles or wheezing.  Cardiovascular: Regular rate and rhythm, normal S1 and S2, and no murmur noted.  Carotids +2, no bruits. No edema. Palpable pulses to  DP and PT arteries.   GI: Normal bowel sounds, soft, non-distended, non-tender, no masses palpated, no hepatosplenomegaly.  Morbidly obese thus limited exam.  Lymph/Hematologic: No cervical lymphadenopathy and no supraclavicular lymphadenopathy.  Skin: Warm and dry.    Musculoskeletal: Full ROM of neck. There is no redness, warmth, or swelling of the joints. Gross motor strength is normal.    Neurologic: Awake, alert, oriented to name, place and time. Cranial nerves II-XII are grossly intact. Gait is normal.   Neuropsychiatric: Calm, cooperative. Normal affect.     Labs: (personally " reviewed)  CBC RESULTS: Lab Test        02/17/17 11/18/16                       1000          1433          WBC           --          13.0*         RBC           --          4.85          HGB          13.1         13.6          HCT           --          41.1          MCV           --          85            MCH           --          28.0          MCHC          --          33.1          RDW           --          12.1          PLT           --          443             Lab Results      Component                Value               Date                      NA                       141                 11/18/2016             Lab Results      Component                Value               Date                      POTASSIUM                4.3                 11/18/2016            Lab Results      Component                Value               Date                      CHLORIDE                 106                 11/18/2016            Lab Results      Component                Value               Date                      YAMILA                      9.2                 11/18/2016            Lab Results      Component                Value               Date                      CO2                      26                  11/18/2016            Lab Results      Component                Value               Date                      BUN                      15                  11/18/2016            Lab Results      Component                Value               Date                      CR                       0.86                11/18/2016            Lab Results      Component                Value               Date                      GLC                      88                  11/18/2016              TSH:  0.9    Lab Test        02/17/17                       1012          COLOR        Yellow        APPEARANCE   Clear         URINEGLC     Negative      URINEBILI    Negative      URINEKETONE  Negative      SG           1.024          UBLD         Small*        URINEPH      5.0           PROTEIN      Negative      NITRITE      Negative      LEUKEST      Negative      RBCU         2           (on menses)  WBCU         <1              ASSESSMENT and PLAN  Agatha Alejandro is a 21 year old female scheduled to undergo Laparoscopic Sleeve Gastrectomy on 2/17/17 by Dr. Fox in treatment of  Morbid obesity.  PAC referral for risk assessment and optimization for anesthesia with comorbid conditions of:    Pre-operative considerations:  1.  Cardiac:  Functional status very good.  0.4%  risk of major adverse cardiac event.  Low risk surgery.  No further cardiac evaluation needed per 2014 ACC/AHA guidelines for non-cardiac surgery.  2.  Pulm:  Airway feasible.  AMAN risk:  Intermediate.  Non-smoker.  No pulmonary issues.   3.  GI:  Risk of PONV score = 2.  If > 2, anti-emetic intervention recommended.  Intermittent GERD but much improved after quit drinking soda.   4.  Psych:  Depression, intol of meds  5.  2/17/17:  On menses.     VTE risk: 0.5%    Patient is optimized and is acceptable candidate for the proposed procedure.  No further diagnostic evaluation is needed.     Patient was discussed with Dr Ayala.    Amelia Henriquez PA-C  Preoperative Assessment Center  [facility]  [facility]  Phone: [phone]  Fax: [phone]

## 2017-02-17 NOTE — PATIENT INSTRUCTIONS
Preparing for Your Surgery      Name:  Agatha Alejandro   MRN:  [id number]   :  1995   Today's Date:  2017     Arriving for surgery:  Surgery date:  3/14/17  Surgery time:  10:40am  Arrival time:  8:40am  Please come to:       [facility] [address]  [address] [place]  [address]    -   parking is available in front of the hospital from 5:15 am to 8:00 pm    -  Stop at the Information Desk in the lobby    -   Inform the information person that you are here for surgery. An escort to 3c will be provided. If you would not like an escort, please proceed to 3C on the 3rd floor. [phone]     What can I eat or drink?  -Follow bowel prep instructions  -  You may have water, apple juice or 7up/Sprite until 2 hours prior to your surgery.    Which medicines can I take?  -  Do NOT take these medications in the morning, the day of surgery:  Vitamin    -  Please take these medications the day of surgery:  Birth control pill    How do I prepare myself?  -  Take two showers: one the night before surgery; and one the morning of surgery.         Use Scrubcare or Hibiclens to wash from neck down.  You may use your own shampoo and conditioner. No other hair products.   -  Do NOT use lotion, powder, deodorant, or antiperspirant the day of your surgery.  -  Do NOT wear any makeup, fingernail polish or jewelry.  -  Begin using Incentive Spirometer 1 week prior to surgery.  Use 4 times per day, up to 5-10 breaths each time.  Bring Incentive Spirometer to hospital.  -Do not bring your own medications to the hospital, except for inhalers and eye drops.  -  Bring your ID and insurance card.    Questions or Concerns:  If you have questions or concerns, please call the  Preoperative Assessment Center, Monday-Friday 7AM-7PM:  [phone]      AFTER YOUR SURGERY  Breathing exercises   Breathing exercises help you recover faster. Take deep breaths and let the air out slowly. This  will:     Help you wake up after surgery.    Help prevent complications like pneumonia.  Preventing complications will help you go home sooner.   We may give you a breathing device (incentive spirometer) to encourage you to breathe deeply.   Nausea and vomiting   You may feel sick to your stomach after surgery; if so, let your nurse know.    Pain control:  After surgery, you may have pain. Our goal is to help you manage your pain. Pain medicine will help you feel comfortable enough to do activities that will help you heal.  These activities may include breathing exercises, walking and physical therapy.   To help your health care team treat your pain we will ask: 1) If you have pain  2) where it is located 3) describe your pain in your words  Methods of pain control include medications given by mouth, vein or by nerve block for some surgeries.  We may give you a pain control pump that will:  1) Deliver the medicine through a tube placed in your vein  2) Control the amount of medicine you receive  3) Allow you to push a button to deliver a dose of pain medicine  Sequential Compression Device (SCD) or Pneumo Boots:  You may need to wear SCD S on your legs or feet. These are wraps connected to a machine that pumps in air and releases it. The repeated pumping helps prevent blood clots from forming.     Using an Incentive Spirometer  Soon after your surgery, a nurse or therapist will teach you breathing exercises. These keep your lungs clear, strengthen your breathing muscles, and help prevent complications.  The exercises include doing a deep-breathing exercise using a device called an incentive spirometer.  To do these exercises, you will breathe in through your mouth and not your nose. The incentive spirometer only works correctly if you breathe in through your mouth.  Four steps to clear lungs     Deep breathing expands the lungs, aids circulation, and helps prevent pneumonia.   1. Exhale normally.    Relax and  breathe out.  2. Place your lips tightly around the mouthpiece.    Make sure the device is upright and not tilted.  3. Inhale as much air as you can through the mouthpiece (don't breath through your nose).    Inhale slowly and deeply.    Hold your breath long enough to keep the balls or disk raised for at least 3 seconds.    If you re inhaling too quickly, your device may make a tone. If you hear this tone, inhale more slowly.  4. Repeat the exercise regularly.    Do this exercise every hour while you're awake, or as your health care provider instructs.    You will also be taught coughing exercises and be asked to do them regularly on your own.    4117-9750 [employer]. [address] [place] [address], [place] [address]. All rights reserved. This information is not intended as a substitute for professional medical care. Always follow your healthcare professional's instructions.

## 2017-02-17 NOTE — ANESTHESIA PREPROCEDURE EVALUATION
Anesthesia Evaluation     . Pt has had prior anesthetic. Type: General    No history of anesthetic complications     ROS/MED HX    ENT/Pulmonary:     (+)AMAN risk factors hypertension, obese, , . .   (-) tobacco use   Neurologic:  - neg neurologic ROS     Cardiovascular:     (+) hypertension-range: not on b/p meds, ---. : . . . :. . No previous cardiac testing      (-) CAD, taking anticoagulants/antiplatelets and EASTON   METS/Exercise Tolerance: Comment: 3-4 days per week.  Walks or lifts weights.  Can walk 1-3 miles.   >4 METS   Hematologic:  - neg hematologic  ROS       Musculoskeletal:  - neg musculoskeletal ROS       GI/Hepatic:     (+) GERD Symptomatic,       Renal/Genitourinary:  - ROS Renal section negative       Endo:     (+) Obesity, .      Psychiatric:     (+) psychiatric history depression      Infectious Disease:  - neg infectious disease ROS       Malignancy:      - no malignancy   Other:               Physical Exam  Normal systems: dental    Airway   Mallampati: I  TM distance: >3 FB  Neck ROM: full    Dental     Cardiovascular   Rhythm and rate: regular and normal      Pulmonary    breath sounds clear to auscultation    Other findings: CBC RESULTS: Lab Test        02/17/17 11/18/16                       1000          1433          WBC           --          13.0*         RBC           --          4.85          HGB          13.1         13.6          HCT           --          41.1          MCV           --          85            MCH           --          28.0          MCHC          --          33.1          RDW           --          12.1          PLT           --          443             Lab Results      Component                Value               Date                      NA                       141                 11/18/2016             Lab Results      Component                Value               Date                      POTASSIUM                4.3                 11/18/2016            Lab  Results      Component                Value               Date                      CHLORIDE                 106                 11/18/2016            Lab Results      Component                Value               Date                      YAMILA                      9.2                 11/18/2016            Lab Results      Component                Value               Date                      CO2                      26                  11/18/2016            Lab Results      Component                Value               Date                      BUN                      15                  11/18/2016            Lab Results      Component                Value               Date                      CR                       0.86                11/18/2016            Lab Results      Component                Value               Date                      GLC                      88                  11/18/2016              TSH:  0.9    Lab Test        02/17/17                       1012          COLOR        Yellow        APPEARANCE   Clear         URINEGLC     Negative      URINEBILI    Negative      URINEKETONE  Negative      SG           1.024         UBLD         Small*        URINEPH      5.0           PROTEIN      Negative      NITRITE      Negative      LEUKEST      Negative      RBCU         2           (on menses)  WBCU         <1                     PAC Discussion and Assessment    ASA Classification: 2  Case is suitable for: [place]  Anesthetic techniques and relevant risks discussed: GA  Invasive monitoring and risk discussed: No  Types:   Possibility and Risk of blood transfusion discussed: No  NPO instructions given:   Additional anesthetic preparation and risks discussed:   Needs early admission to pre-op area:   Other:     PAC Resident/NP Anesthesia Assessment:  Scheduled for Laparoscopic Sleeve Gastrectomy on 2/17/17 by Dr. Fox in treatment of  Morbid obesity.  PAC referral for risk assessment and  optimization for anesthesia with comorbid conditions of:    * difficult IV start *    Pre-operative considerations:  1.  Cardiac:  Functional status very good.  0.4%  risk of major adverse cardiac event.  Low risk surgery.  No further cardiac evaluation needed per 2014 ACC/AHA guidelines for non-cardiac surgery.  2.  Pulm:  Airway feasible.  AMAN risk:  Intermediate.  Non-smoker.  No pulmonary issues.   3.  GI:  Risk of PONV score = 2.  If > 2, anti-emetic intervention recommended.  Intermittent GERD but much improved after quit drinking soda.   4.  Psych:  Depression, intol of meds  5.  2/17/17:  On menses.     VTE risk: 0.5%    Patient is optimized and is acceptable candidate for the proposed procedure.  No further diagnostic evaluation is needed.     Patient also evaluated by Dr. Ayala. See recommendations below.           Reviewed and Signed by PAC Mid-Level Provider/Resident  Mid-Level Provider/Resident: Amelia Henriquez PA-C  Date: 2/17/17  Time: 0950    Attending Anesthesiologist Anesthesia Assessment:  21 year old for lap gastric sleeve. Chart reviewed, patient seen and evaluated; agree with above assessment.    Patient is appropriate for the planned procedure without further workup or medical management change. The final anesthesia plan will be determined by the physician anesthesiologist caring for the patient on the day of surgery.      Reviewed and Signed by PAC Anesthesiologist  Anesthesiologist: tod  Date: 2/17/2017  Time:   Pass/Fail: Pass  Disposition:     PAC Pharmacist Assessment:        Pharmacist:   Date:   Time:      Anesthesia Plan      History & Physical Review  History and physical reviewed and following examination; no interval change.    ASA Status:  2 .    NPO Status:  > 8 hours    Plan for General and ETT with Intravenous induction. Maintenance will be Balanced.    PONV prophylaxis:  Ondansetron (or other 5HT-3) and Other (See comment)  Additional equipment: 2nd IV      Postoperative  Care  Postoperative pain management:  IV analgesics and Multi-modal analgesia.      Consents  Anesthetic plan, risks, benefits and alternatives discussed with:  Patient..                          .

## 2017-03-14 ENCOUNTER — ANESTHESIA (OUTPATIENT)
Dept: SURGERY | Facility: CLINIC | Age: 22
DRG: 621 | End: 2017-03-14
Payer: COMMERCIAL

## 2017-03-14 ENCOUNTER — HOSPITAL ENCOUNTER (INPATIENT)
Facility: CLINIC | Age: 22
LOS: 1 days | Discharge: HOME | DRG: 621 | End: 2017-03-15
Attending: SURGERY | Admitting: SURGERY
Payer: COMMERCIAL

## 2017-03-14 DIAGNOSIS — E66.01 MORBID OBESITY DUE TO EXCESS CALORIES (H): Primary | ICD-10-CM

## 2017-03-14 LAB
CREAT SERPL-MCNC: 0.8 MG/DL (ref 0.52–1.04)
GFR SERPL CREATININE-BSD FRML MDRD: 88 ML/MIN/1.7M2
HCG UR QL: NEGATIVE
PLATELET # BLD AUTO: 266 10E9/L (ref 150–450)

## 2017-03-14 PROCEDURE — 36000064 ZZH SURGERY LEVEL 4 EA 15 ADDTL MIN - UMMC: Performed by: SURGERY

## 2017-03-14 PROCEDURE — 25000125 ZZHC RX 250: Performed by: SURGERY

## 2017-03-14 PROCEDURE — 37000009 ZZH ANESTHESIA TECHNICAL FEE, EACH ADDTL 15 MIN: Performed by: SURGERY

## 2017-03-14 PROCEDURE — 25000128 H RX IP 250 OP 636: Performed by: NURSE ANESTHETIST, CERTIFIED REGISTERED

## 2017-03-14 PROCEDURE — 25000125 ZZHC RX 250: Performed by: NURSE ANESTHETIST, CERTIFIED REGISTERED

## 2017-03-14 PROCEDURE — 82565 ASSAY OF CREATININE: CPT | Performed by: SURGERY

## 2017-03-14 PROCEDURE — 88305 TISSUE EXAM BY PATHOLOGIST: CPT | Performed by: SURGERY

## 2017-03-14 PROCEDURE — 81025 URINE PREGNANCY TEST: CPT | Performed by: ANESTHESIOLOGY

## 2017-03-14 PROCEDURE — 25000128 H RX IP 250 OP 636: Performed by: ANESTHESIOLOGY

## 2017-03-14 PROCEDURE — 85049 AUTOMATED PLATELET COUNT: CPT | Performed by: SURGERY

## 2017-03-14 PROCEDURE — 25000128 H RX IP 250 OP 636: Performed by: SURGERY

## 2017-03-14 PROCEDURE — 37000008 ZZH ANESTHESIA TECHNICAL FEE, 1ST 30 MIN: Performed by: SURGERY

## 2017-03-14 PROCEDURE — 36000062 ZZH SURGERY LEVEL 4 1ST 30 MIN - UMMC: Performed by: SURGERY

## 2017-03-14 PROCEDURE — 0DB64Z3 EXCISION OF STOMACH, PERCUTANEOUS ENDOSCOPIC APPROACH, VERTICAL: ICD-10-PCS | Performed by: SURGERY

## 2017-03-14 PROCEDURE — 25000125 ZZHC RX 250: Performed by: PHYSICIAN ASSISTANT

## 2017-03-14 PROCEDURE — 40000170 ZZH STATISTIC PRE-PROCEDURE ASSESSMENT II: Performed by: SURGERY

## 2017-03-14 PROCEDURE — 71000014 ZZH RECOVERY PHASE 1 LEVEL 2 FIRST HR: Performed by: SURGERY

## 2017-03-14 PROCEDURE — 25800025 ZZH RX 258: Performed by: SURGERY

## 2017-03-14 PROCEDURE — 27210794 ZZH OR GENERAL SUPPLY STERILE: Performed by: SURGERY

## 2017-03-14 PROCEDURE — C9399 UNCLASSIFIED DRUGS OR BIOLOG: HCPCS | Performed by: NURSE ANESTHETIST, CERTIFIED REGISTERED

## 2017-03-14 PROCEDURE — 25800025 ZZH RX 258: Performed by: NURSE ANESTHETIST, CERTIFIED REGISTERED

## 2017-03-14 PROCEDURE — 12000003 ZZH R&B CRITICAL UMMC

## 2017-03-14 PROCEDURE — 25000125 ZZHC RX 250: Performed by: ANESTHESIOLOGY

## 2017-03-14 PROCEDURE — S0020 INJECTION, BUPIVICAINE HYDRO: HCPCS | Performed by: SURGERY

## 2017-03-14 PROCEDURE — S0077 INJECTION, CLINDAMYCIN PHOSP: HCPCS | Performed by: PHYSICIAN ASSISTANT

## 2017-03-14 PROCEDURE — 25000566 ZZH SEVOFLURANE, EA 15 MIN: Performed by: SURGERY

## 2017-03-14 RX ORDER — SCOLOPAMINE TRANSDERMAL SYSTEM 1 MG/1
1 PATCH, EXTENDED RELEASE TRANSDERMAL ONCE
Status: DISCONTINUED | OUTPATIENT
Start: 2017-03-14 | End: 2017-03-14 | Stop reason: HOSPADM

## 2017-03-14 RX ORDER — SODIUM CHLORIDE, SODIUM LACTATE, POTASSIUM CHLORIDE, CALCIUM CHLORIDE 600; 310; 30; 20 MG/100ML; MG/100ML; MG/100ML; MG/100ML
INJECTION, SOLUTION INTRAVENOUS CONTINUOUS PRN
Status: DISCONTINUED | OUTPATIENT
Start: 2017-03-14 | End: 2017-03-14

## 2017-03-14 RX ORDER — ONDANSETRON 2 MG/ML
INJECTION INTRAMUSCULAR; INTRAVENOUS PRN
Status: DISCONTINUED | OUTPATIENT
Start: 2017-03-14 | End: 2017-03-14

## 2017-03-14 RX ORDER — BUPIVACAINE HYDROCHLORIDE 2.5 MG/ML
INJECTION, SOLUTION EPIDURAL; INFILTRATION; INTRACAUDAL PRN
Status: DISCONTINUED | OUTPATIENT
Start: 2017-03-14 | End: 2017-03-14

## 2017-03-14 RX ORDER — FENTANYL CITRATE 50 UG/ML
25-50 INJECTION, SOLUTION INTRAMUSCULAR; INTRAVENOUS
Status: DISCONTINUED | OUTPATIENT
Start: 2017-03-14 | End: 2017-03-14 | Stop reason: HOSPADM

## 2017-03-14 RX ORDER — NALOXONE HYDROCHLORIDE 0.4 MG/ML
.1-.4 INJECTION, SOLUTION INTRAMUSCULAR; INTRAVENOUS; SUBCUTANEOUS
Status: DISCONTINUED | OUTPATIENT
Start: 2017-03-14 | End: 2017-03-15 | Stop reason: HOSPADM

## 2017-03-14 RX ORDER — SODIUM CHLORIDE, SODIUM LACTATE, POTASSIUM CHLORIDE, CALCIUM CHLORIDE 600; 310; 30; 20 MG/100ML; MG/100ML; MG/100ML; MG/100ML
INJECTION, SOLUTION INTRAVENOUS CONTINUOUS
Status: DISCONTINUED | OUTPATIENT
Start: 2017-03-14 | End: 2017-03-14 | Stop reason: HOSPADM

## 2017-03-14 RX ORDER — LIDOCAINE HYDROCHLORIDE 20 MG/ML
INJECTION, SOLUTION INFILTRATION; PERINEURAL PRN
Status: DISCONTINUED | OUTPATIENT
Start: 2017-03-14 | End: 2017-03-14

## 2017-03-14 RX ORDER — FENTANYL CITRATE 50 UG/ML
INJECTION, SOLUTION INTRAMUSCULAR; INTRAVENOUS PRN
Status: DISCONTINUED | OUTPATIENT
Start: 2017-03-14 | End: 2017-03-14

## 2017-03-14 RX ORDER — DEXAMETHASONE SODIUM PHOSPHATE 4 MG/ML
INJECTION, SOLUTION INTRA-ARTICULAR; INTRALESIONAL; INTRAMUSCULAR; INTRAVENOUS; SOFT TISSUE PRN
Status: DISCONTINUED | OUTPATIENT
Start: 2017-03-14 | End: 2017-03-14

## 2017-03-14 RX ORDER — ONDANSETRON 4 MG/1
4 TABLET, ORALLY DISINTEGRATING ORAL EVERY 30 MIN PRN
Status: DISCONTINUED | OUTPATIENT
Start: 2017-03-14 | End: 2017-03-14 | Stop reason: HOSPADM

## 2017-03-14 RX ORDER — ONDANSETRON 4 MG/1
4 TABLET, ORALLY DISINTEGRATING ORAL EVERY 6 HOURS PRN
Status: DISCONTINUED | OUTPATIENT
Start: 2017-03-14 | End: 2017-03-15

## 2017-03-14 RX ORDER — PROPOFOL 10 MG/ML
INJECTION, EMULSION INTRAVENOUS PRN
Status: DISCONTINUED | OUTPATIENT
Start: 2017-03-14 | End: 2017-03-14

## 2017-03-14 RX ORDER — ONDANSETRON 2 MG/ML
4 INJECTION INTRAMUSCULAR; INTRAVENOUS EVERY 6 HOURS PRN
Status: DISCONTINUED | OUTPATIENT
Start: 2017-03-14 | End: 2017-03-15 | Stop reason: HOSPADM

## 2017-03-14 RX ORDER — SODIUM CHLORIDE, SODIUM LACTATE, POTASSIUM CHLORIDE, CALCIUM CHLORIDE 600; 310; 30; 20 MG/100ML; MG/100ML; MG/100ML; MG/100ML
INJECTION, SOLUTION INTRAVENOUS CONTINUOUS
Status: DISCONTINUED | OUTPATIENT
Start: 2017-03-14 | End: 2017-03-15

## 2017-03-14 RX ORDER — KETOROLAC TROMETHAMINE 30 MG/ML
30 INJECTION, SOLUTION INTRAMUSCULAR; INTRAVENOUS EVERY 6 HOURS
Status: DISCONTINUED | OUTPATIENT
Start: 2017-03-14 | End: 2017-03-15 | Stop reason: HOSPADM

## 2017-03-14 RX ORDER — LIDOCAINE 40 MG/G
CREAM TOPICAL
Status: DISCONTINUED | OUTPATIENT
Start: 2017-03-14 | End: 2017-03-15 | Stop reason: HOSPADM

## 2017-03-14 RX ORDER — ESMOLOL HYDROCHLORIDE 10 MG/ML
INJECTION INTRAVENOUS PRN
Status: DISCONTINUED | OUTPATIENT
Start: 2017-03-14 | End: 2017-03-14

## 2017-03-14 RX ORDER — CLINDAMYCIN PHOSPHATE 900 MG/50ML
900 INJECTION, SOLUTION INTRAVENOUS SEE ADMIN INSTRUCTIONS
Status: DISCONTINUED | OUTPATIENT
Start: 2017-03-14 | End: 2017-03-14 | Stop reason: HOSPADM

## 2017-03-14 RX ORDER — PROCHLORPERAZINE MALEATE 5 MG
5-10 TABLET ORAL EVERY 6 HOURS PRN
Status: DISCONTINUED | OUTPATIENT
Start: 2017-03-14 | End: 2017-03-15

## 2017-03-14 RX ORDER — CLINDAMYCIN PHOSPHATE 900 MG/50ML
900 INJECTION, SOLUTION INTRAVENOUS
Status: COMPLETED | OUTPATIENT
Start: 2017-03-14 | End: 2017-03-14

## 2017-03-14 RX ORDER — ONDANSETRON 2 MG/ML
4 INJECTION INTRAMUSCULAR; INTRAVENOUS EVERY 30 MIN PRN
Status: DISCONTINUED | OUTPATIENT
Start: 2017-03-14 | End: 2017-03-14 | Stop reason: HOSPADM

## 2017-03-14 RX ORDER — HYDROMORPHONE HYDROCHLORIDE 1 MG/ML
.3-.5 INJECTION, SOLUTION INTRAMUSCULAR; INTRAVENOUS; SUBCUTANEOUS EVERY 5 MIN PRN
Status: DISCONTINUED | OUTPATIENT
Start: 2017-03-14 | End: 2017-03-14 | Stop reason: HOSPADM

## 2017-03-14 RX ORDER — LIDOCAINE 40 MG/G
CREAM TOPICAL
Status: DISCONTINUED | OUTPATIENT
Start: 2017-03-14 | End: 2017-03-14 | Stop reason: HOSPADM

## 2017-03-14 RX ADMIN — FENTANYL CITRATE 50 MCG: 50 INJECTION, SOLUTION INTRAMUSCULAR; INTRAVENOUS at 11:56

## 2017-03-14 RX ADMIN — ESMOLOL HYDROCHLORIDE 10 MG: 10 INJECTION, SOLUTION INTRAVENOUS at 12:05

## 2017-03-14 RX ADMIN — SUCCINYLCHOLINE CHLORIDE 140 MG: 20 INJECTION, SOLUTION INTRAMUSCULAR; INTRAVENOUS at 11:41

## 2017-03-14 RX ADMIN — KETOROLAC TROMETHAMINE 30 MG: 30 INJECTION, SOLUTION INTRAMUSCULAR at 20:14

## 2017-03-14 RX ADMIN — FENTANYL CITRATE 25 MCG: 50 INJECTION, SOLUTION INTRAMUSCULAR; INTRAVENOUS at 13:29

## 2017-03-14 RX ADMIN — ONDANSETRON 4 MG: 2 INJECTION INTRAMUSCULAR; INTRAVENOUS at 13:32

## 2017-03-14 RX ADMIN — HYDROMORPHONE HYDROCHLORIDE: 10 INJECTION, SOLUTION INTRAMUSCULAR; INTRAVENOUS; SUBCUTANEOUS at 13:22

## 2017-03-14 RX ADMIN — FENTANYL CITRATE 50 MCG: 50 INJECTION, SOLUTION INTRAMUSCULAR; INTRAVENOUS at 13:56

## 2017-03-14 RX ADMIN — DEXAMETHASONE SODIUM PHOSPHATE 10 MG: 4 INJECTION, SOLUTION INTRAMUSCULAR; INTRAVENOUS at 11:53

## 2017-03-14 RX ADMIN — CLINDAMYCIN PHOSPHATE 900 MG: 18 INJECTION, SOLUTION INTRAVENOUS at 11:43

## 2017-03-14 RX ADMIN — MIDAZOLAM HYDROCHLORIDE 2 MG: 1 INJECTION, SOLUTION INTRAMUSCULAR; INTRAVENOUS at 11:32

## 2017-03-14 RX ADMIN — SODIUM CHLORIDE, POTASSIUM CHLORIDE, SODIUM LACTATE AND CALCIUM CHLORIDE: 600; 310; 30; 20 INJECTION, SOLUTION INTRAVENOUS at 11:13

## 2017-03-14 RX ADMIN — ROCURONIUM BROMIDE 50 MG: 10 INJECTION INTRAVENOUS at 11:47

## 2017-03-14 RX ADMIN — LIDOCAINE HYDROCHLORIDE 80 MG: 20 INJECTION, SOLUTION INFILTRATION; PERINEURAL at 11:41

## 2017-03-14 RX ADMIN — SODIUM CHLORIDE, POTASSIUM CHLORIDE, SODIUM LACTATE AND CALCIUM CHLORIDE: 600; 310; 30; 20 INJECTION, SOLUTION INTRAVENOUS at 16:21

## 2017-03-14 RX ADMIN — FENTANYL CITRATE 50 MCG: 50 INJECTION, SOLUTION INTRAMUSCULAR; INTRAVENOUS at 13:08

## 2017-03-14 RX ADMIN — SUGAMMADEX 200 MG: 100 INJECTION, SOLUTION INTRAVENOUS at 12:54

## 2017-03-14 RX ADMIN — FENTANYL CITRATE 50 MCG: 50 INJECTION, SOLUTION INTRAMUSCULAR; INTRAVENOUS at 11:41

## 2017-03-14 RX ADMIN — ONDANSETRON 4 MG: 2 INJECTION INTRAMUSCULAR; INTRAVENOUS at 21:48

## 2017-03-14 RX ADMIN — SODIUM CHLORIDE, POTASSIUM CHLORIDE, SODIUM LACTATE AND CALCIUM CHLORIDE: 600; 310; 30; 20 INJECTION, SOLUTION INTRAVENOUS at 12:51

## 2017-03-14 RX ADMIN — FENTANYL CITRATE 50 MCG: 50 INJECTION, SOLUTION INTRAMUSCULAR; INTRAVENOUS at 13:03

## 2017-03-14 RX ADMIN — PROPOFOL 180 MG: 10 INJECTION, EMULSION INTRAVENOUS at 11:41

## 2017-03-14 RX ADMIN — ONDANSETRON 4 MG: 2 INJECTION INTRAMUSCULAR; INTRAVENOUS at 11:53

## 2017-03-14 RX ADMIN — KETOROLAC TROMETHAMINE 30 MG: 30 INJECTION, SOLUTION INTRAMUSCULAR at 13:25

## 2017-03-14 RX ADMIN — FENTANYL CITRATE 50 MCG: 50 INJECTION, SOLUTION INTRAMUSCULAR; INTRAVENOUS at 11:48

## 2017-03-14 RX ADMIN — FENTANYL CITRATE 25 MCG: 50 INJECTION, SOLUTION INTRAMUSCULAR; INTRAVENOUS at 13:46

## 2017-03-14 ASSESSMENT — ACTIVITIES OF DAILY LIVING (ADL)
SWALLOWING: 0-->SWALLOWS FOODS/LIQUIDS WITHOUT DIFFICULTY
EATING: 0-->INDEPENDENT
TRANSFERRING: 0-->INDEPENDENT
FALL_HISTORY_WITHIN_LAST_SIX_MONTHS: NO
RETIRED_COMMUNICATION: 0-->UNDERSTANDS/COMMUNICATES WITHOUT DIFFICULTY
AMBULATION: 0-->INDEPENDENT
RETIRED_EATING: 0-->INDEPENDENT
COGNITION: 0 - NO COGNITION ISSUES REPORTED
BATHING: 0-->INDEPENDENT
DRESS: 0-->INDEPENDENT
TOILETING: 0-->INDEPENDENT
BATHING: 0-->INDEPENDENT
COMMUNICATION: 0-->UNDERSTANDS/COMMUNICATES WITHOUT DIFFICULTY
AMBULATION: 2-->ASSISTIVE PERSON
TRANSFERRING: 0-->INDEPENDENT
TOILETING: 0-->INDEPENDENT
SWALLOWING: 0-->SWALLOWS FOODS/LIQUIDS WITHOUT DIFFICULTY
DRESS: 0-->INDEPENDENT

## 2017-03-14 NOTE — IP AVS SNAPSHOT
MRN:[id number]                      After Visit Summary   3/14/2017    Agatha Alejandro    MRN: [id number]           Thank you!     Thank you for choosing [facility] for your care. Our goal is always to provide you with excellent care. Hearing back from our patients is one way we can continue to improve our services. Please take a few minutes to complete the written survey that you may receive in the mail after you visit with us. Thank you!        Patient Information     Date Of Birth          1995        About your hospital stay     You were admitted on:  March 14, 2017 You last received care in the:  [address] [facility]    You were discharged on:  March 15, 2017        Reason for your hospital stay       Laparoscopic sleeve gastrectomy                  Who to Call     For medical emergencies, please call 911.  For non-urgent questions about your medical care, please call your primary care provider or clinic, [phone]  For questions related to your surgery, please call your surgery clinic        Attending Provider     Provider Specialty    Aime Fox MD Surgery       Primary Care Provider Office Phone # Fax #    Makayla Campos [phone]       [facility] [place] [address] [facility] [address] [place] [address]        After Care Instructions     Activity       Your activity upon discharge: activity as tolerated            Diet       Follow this diet upon discharge: Bariatric clear liquids only until seen in clinic.            Discharge Instructions       Diet on discharge: bariatric clear liquids. Your diet will be advanced at your clinic visit.    No lifting >10 pounds for 4-6 weeks. Discuss with your surgeon at follow up appointment    May shower starting postoperative day #1 but no scrubbing incisions. No bathing or soaking incisions for 2 weeks or until incisions completely healed.  Wound care: Keep clean and dry. Steri strips or glue will fall off on their own.       After surgery it is ok to swallow medications smaller than 1/4 inch(size of pencil eraser) for all procedures.  If medication is larger than 1/4 inch then it will need to be crushed, cut or in liquid form for 1-2 months after surgery. This can be discussed with surgeon team at the 1 month follow up appointment and will depend on patient tolerance.    If you still have your gallbladder you will be given a prescription called Actigall or Ursodiol in a capsule form to prevent gallstones during rapid weight loss.  This capsule can be opened and put into your liquids or food (when your diet progresses). You will need to take this medications for 6 months after surgery.    Follow-up with your surgery team in 1-2 weeks. If this appointment was not previous made for you please call [phone] and choose option #1 to schedule your follow-up appointment.     You will receive a call from our clinic nurse after surgery.  If you have any questions and would like to speak with a nurse please call [phone] and choose option #3 to speak with a triage nurse.    Call [phone] and ask to speak with surgery resident if you are having troubles in the evenings, at night, or on weekends. Please call if you experience increasing abdominal pain, nausea, vomiting, increasing drainage from your wounds, chills, or fever >101.5    Take stool softener while taking narcotic pain medication.  No driving for at least 12 hours after taking narcotic pain medication.    Appointments located at [facility]:  [address] [facility] [place] [address]  [place] [address]                  Follow-up Appointments     Adult [facility] Follow-up and recommended labs and tests       Follow up with Dr. Fox , at (location with clinic name or city) [facility] ([facility]), within one week  to evaluate after surgery.    Appointments on [place] and/or [facility] (with [facility] or [facility] provider or service). Call [phone] if you haven't  heard regarding these appointments within 7 days of discharge.                  Your next 10 appointments already scheduled     Mar 27, 2017  9:40 AM CDT   (Arrive by 9:25 AM)   RETURN BARIATRIC SURGERY with Aime Fox MD   [facility] ([facility])    [address] [place] [address]   [phone]            Mar 27, 2017 10:00 AM CDT   NUTRITION VISIT with Rama Baker RD   [facility] ([facility])    [address] [place] [address]   [phone]            Apr 20, 2017  9:40 AM CDT   (Arrive by 9:25 AM)   RETURN BARIATRIC SURGERY with Aime Fox MD   [facility] ([facility])    [address] [place] [address]   [phone]            Apr 20, 2017 10:00 AM CDT   NUTRITION VISIT with Rama Baker RD   [facility] ([facility])    [address] [place] [address]   [phone]              Additional Information     If you use hormonal birth control (such as the pill, patch, ring or implants): You'll need a second form of birth control for 7 days (condoms, a diaphragm or contraceptive foam). While in the hospital, you received a medicine called Bridion. Your normal birth control will not work as well for a week after taking this medicine.          Pending Results     Date and Time Order Name Status Description    3/14/2017 1208 Surgical pathology exam In process             Statement of Approval     Ordered          03/15/17 1222  I have reviewed and agree with all the recommendations and orders detailed in this document.  EFFECTIVE NOW     Approved and electronically signed by:  Franko Becker MD             Admission Information     Date & Time Provider Department Dept. Phone     "3/14/2017 Aime Fox MD [address] [facility] [phone]      Your Vitals Were     Blood Pressure Temperature Respirations Height Weight       156/67 (BP Location: Left arm) 97.7  F (36.5  C) 18 1.676 m (5' 5.98\") 115.1 kg (253 lb 12 oz)     Pulse Oximetry BMI (Body Mass Index)                97% 40.98 kg/m2          [employer] Information     [employer] gives you secure access to your electronic health record. If you see a primary care provider, you can also send messages to your care team and make appointments. If you have questions, please call your primary care clinic.  If you do not have a primary care provider, please call [phone] and they will assist you.        Care EveryWhere ID     This is your Care EveryWhere ID. This could be used by other organizations to access your [facility] medical records  [id number]           Review of your medicines      START taking        Dose / Directions    ondansetron 4 MG tablet   Commonly known as:  ZOFRAN        Dose:  4-8 mg   Take 1-2 tablets (4-8 mg) by mouth every 8 hours as needed for nausea   Quantity:  10 tablet   Refills:  0       oxyCODONE 5 MG/5ML solution   Commonly known as:  ROXICODONE        Dose:  5-10 mg   Take 5-10 mLs (5-10 mg) by mouth every 4 hours as needed for moderate to severe pain   Quantity:  100 mL   Refills:  0       ursodiol 300 MG capsule   Commonly known as:  ACTIGALL        Dose:  300 mg   Take 1 capsule (300 mg) by mouth 2 times daily   Quantity:  60 capsule   Refills:  2         CONTINUE these medicines which may have CHANGED, or have new prescriptions. If we are uncertain of the size of tablets/capsules you have at home, strength may be listed as something that might have changed.        Dose / Directions    norgestim-eth estrad triphasic 0.18/0.215/0.25 MG-35 MCG per tablet   Commonly known as:  TRINESSA (28)   This may have changed:  when to take this   Used for:  Well adolescent visit        Dose:  1 tablet   Take 1 " tablet by mouth daily   Quantity:  3 Package   Refills:  3         CONTINUE these medicines which have NOT CHANGED        Dose / Directions    MULTIVITAMIN ADULT PO   Used for:  Bariatric surgery status        Take by mouth every morning   Refills:  0            Where to get your medicines      These medications were sent to [facility] - [place], [place] - [address]  [address] [facility] [address]     Phone:  [phone]     ondansetron 4 MG tablet    ursodiol 300 MG capsule         Some of these will need a paper prescription and others can be bought over the counter. Ask your nurse if you have questions.     Bring a paper prescription for each of these medications     oxyCODONE 5 MG/5ML solution                Protect others around you: Learn how to safely use, store and throw away your medicines at www.disposemymeds.org.             Medication List: This is a list of all your medications and when to take them. Check marks below indicate your daily home schedule. Keep this list as a reference.      Medications           Morning Afternoon Evening Bedtime As Needed    MULTIVITAMIN ADULT PO   Take by mouth every morning                                norgestim-eth estrad triphasic 0.18/0.215/0.25 MG-35 MCG per tablet   Commonly known as:  TRINESSA (28)   Take 1 tablet by mouth daily                                ondansetron 4 MG tablet   Commonly known as:  ZOFRAN   Take 1-2 tablets (4-8 mg) by mouth every 8 hours as needed for nausea                                oxyCODONE 5 MG/5ML solution   Commonly known as:  ROXICODONE   Take 5-10 mLs (5-10 mg) by mouth every 4 hours as needed for moderate to severe pain   Last time this was given:  5 mg on 3/15/2017 11:55 AM                                ursodiol 300 MG capsule   Commonly known as:  ACTIGALL   Take 1 capsule (300 mg) by mouth 2 times daily

## 2017-03-14 NOTE — LETTER
Return to  Work Release    Date: 3/15/2017      Name: Agatha Alejandro                       YOB: 1995    Medical Record Number: [id number]    The patient was seen at: [facility]    Restrictions if any: No lifting more than 10 pounds for 4-6 weeks.     Resume Activity: When physician gives release. Expected date for first follow-up: one week.        _________________________      - - - - - - - - - - - -  Franko Becker MD  PGY-1, General Surgery  [facility]  Pager: [phone]

## 2017-03-14 NOTE — LETTER
Transition Communication Hand-off for Care Transitions to Next Level of Care Provider    Name: Agatha Alejandro  MRN #: [id number]  Primary Care Provider: Makayla Campos     Primary Clinic: [facility] [place] [address] [place] [address]  [facility] [address]     Reason for Hospitalization:  Morbid Obesity   Morbid obesity (H)  Admit Date/Time: 3/14/2017  8:29 AM  Discharge Date: 3/15/2017  Payor Source: Payor: PREFERREDONE / Plan: [employer] ADMIN SERV OPEN ACCESS / Product Type: PPO /     Reason for Communication Hand-off Referral: Other continuity of care    Discharge Plan:  Discharged to home with plan for clinic f/u       Follow-up plan:  Future Appointments  Date Time Provider Department Center   3/27/2017 9:40 AM Aime Fox MD [facility]   3/27/2017 10:00 AM Rama Baker RD [facility]   4/20/2017 9:40 AM Aime Fox MD [facility]   4/20/2017 10:00 AM Rama Baker RD [facility]     Aster Mccracken, RNCC  [phone]    AVS/Discharge Summary is the source of truth; this is a helpful guide for improved communication of patient story

## 2017-03-14 NOTE — ANESTHESIA CARE TRANSFER NOTE
Patient: Agatha Alejandro    Procedure(s):  Laparoscopic Sleeve Gastrectomy - Wound Class: II-Clean Contaminated    Diagnosis: Morbid Obesity   Diagnosis Additional Information: No value filed.    Anesthesia Type:   General, ETT     Note:  Airway :Face Mask  Patient transferred to:PACU  Comments: To PACU, VSS, airway patent, RN at bedside.      Vitals: (Last set prior to Anesthesia Care Transfer)    CRNA VITALS  3/14/2017 1234 - 3/14/2017 1307      3/14/2017             Pulse: 106    SpO2: 100 %    Resp Rate (observed): 16    Resp Rate (set): 10                Electronically Signed By: LCAI Kohli CRNA  March 14, 2017  1:07 PM

## 2017-03-14 NOTE — ANESTHESIA POSTPROCEDURE EVALUATION
Patient: Agatha Alejandro    Procedure(s):  Laparoscopic Sleeve Gastrectomy - Wound Class: II-Clean Contaminated    Diagnosis:Morbid Obesity   Diagnosis Additional Information: No value filed.    Anesthesia Type:  General, ETT    Note:  Anesthesia Post Evaluation    Patient location during evaluation: PACU  Patient participation: Able to fully participate in evaluation  Level of consciousness: awake  Pain management: adequate  Airway patency: patent  Cardiovascular status: acceptable  Respiratory status: acceptable  Hydration status: acceptable  PONV: none     Anesthetic complications: None          Last vitals:  Vitals:    03/14/17 0855   BP: (!) 147/102   Resp: 18   Temp: 36.9  C (98.5  F)   SpO2: 99%         Electronically Signed By: Apolonia Zamudio MD  March 14, 2017  1:13 PM

## 2017-03-14 NOTE — OP NOTE
[facility], [place]    Operative Note    Pre-operative diagnosis: Morbid Obesity    Post-operative diagnosis * No post-op diagnosis entered *   Procedure: Procedure(s):  Laparoscopic Sleeve Gastrectomy - Wound Class: II-Clean Contaminated   Surgeon: Surgeon(s) and Role:     * Aime Fox MD - Primary     * Saloni Palma MD - Resident - Assisting     * Franko Becker MD - Resident - Assisting   Anesthesia: General    Estimated blood loss: * No values recorded between 3/14/2017 12:00 PM and 3/14/2017  1:00 PM *   Drains: None   Specimens:   ID Type Source Tests Collected by Time Destination   A : Subcutaneous Adipose Tissue Other SURGICAL PATHOLOGY EXAM Aime Fox MD 3/14/2017 12:04 PM    B : Visceral Adipose Tissue Other SURGICAL PATHOLOGY EXAM Aime Fox MD 3/14/2017 12:06 PM    C : Partial Gastrectomy Tissue Stomach, Body SURGICAL PATHOLOGY EXAM Aime Fox MD 3/14/2017 12:35 PM       Findings: None.   Complications: None.   Implants: None.         BOUGIE SIZE: 40 FR  DISTANCE FROM PYLORUS: 10 CM  STAPLE LINE REINFORCEMENT: NO  STAPLE LINE OVERSEW: NO  COMORBIDITIES:   Past Medical History   Diagnosis Date     Depression      History of methamphetamine abuse 2013     3 weeks of use.  Went to rehab     Morbid obesity (H)        INDICATIONS FOR PROCEDURE  Agatha Alejandro is a 21 year old female who is morbidly obese.  Numerous weight loss attempts without surgery have been without success.     After understanding the risks and benefits of proceeding with a laparoscopic vertical sleeve gastrectomy, she agreed to an operation as outlined by Dr. Dominique Fox reviewed the risks of surgery with Agatha Alejandro.    These include, but are not limited to, death, myocardial infarction, pneumonia, urinary tract infection, deep venous thrombosis with or without pulmonary embolus, abdominal infection from bowel injury or  "abscess, bowel obstruction, wound infection, and bleeding.    More specific risks related to vertical sleeve gastrectomy were detailed at the bariatric informational seminar and include the followin.) leak at the vertical sleeve staple line, 2.) stricture in the sleeve, 3.) nausea, vomiting, and dehydration for several months, 4.) adhesions causing bowel obstruction, 5.) rapid weight loss causing a higher rate of gallstone formation during the first 6 months after surgery, 6.) decreased absorption of vitamins because of the reduced stomach size, 7.) weight regain if inappropriate food intake occurs.    The BMI that we are treating this patient for was measured at the initial consultation visit in our bariatric program and it was: 44.37 kg/m2 (as calculated just below).      The initial consult height, weight, and BMI are as follows:    Height: 5 feet, 5.5 inches; weight: 270 lbs; BMI: 44.37 kg/m2.    Our weight loss surgery program requires weight loss prior to bariatric surgery and currently the height, weight, and BMI are as follows:    Height: 167.6 cm (5' 5.98\"), Weight: 115.1 kg (253 lb 12 oz), and currently the Body mass index is 40.98 kg/(m^2).        OPERATIVE PROCEDURE:     Agatha Alejandro was brought to the operating room and prepared in routine fashion. Under the benefits of general anesthesia, a left upper quadrant Veress needle was inserted and pneumoperitoneum was established using carbon dioxide gas to a maximum pressure of 15 mmHg. A total of five ports were placed into the abdomen.     A liver retractor was placed through the rightmost port and this provided a view of the upper stomach. The operation was started by dividing the short gastric vessels off the greater curvature of the stomach. This dissection was carried up to the angle of His, and ultrasonic dissector was used for hemostasis.       A bougie (size noted above) was passed into the stomach and we used 5 purple loads  of the Endo SAÚL " stapler device to create a vertical sleeve gastrectomy with the bougie as a template. The bougie was removed.    The sleeve gastrectomy specimen (partial gastrectomy) was now removed from the abdomen through the 15 mm port.     Hemostasis was secured, and the liver retractor and all ports were removed from the abdomen under direct visualization.     All needle and sponge counts were correct x2 at the end of the operation, and Dr. Fox was present for all critical components of the procedure.     Skin incisions were closed using skin staples, and sterile dressings were placed.     Saloni Palma MD  Chief Surgery Resident  [phone]    Aime Fox MD  Surgery  [phone] (hospital )  [phone] (clinic nurses)

## 2017-03-14 NOTE — IP AVS SNAPSHOT
[address] [address]    Phone:  [phone]                                       After Visit Summary   3/14/2017    Agatha Alejandro    MRN: [id number]           After Visit Summary Signature Page     I have received my discharge instructions, and my questions have been answered. I have discussed any challenges I see with this plan with the nurse or doctor.    ..........................................................................................................................................  Patient/Patient Representative Signature      ..........................................................................................................................................  Patient Representative Print Name and Relationship to Patient    ..................................................               ................................................  Date                                            Time    ..........................................................................................................................................  Reviewed by Signature/Title    ...................................................              ..............................................  Date                                                            Time

## 2017-03-15 ENCOUNTER — CARE COORDINATION (OUTPATIENT)
Dept: CARDIOLOGY | Facility: CLINIC | Age: 22
End: 2017-03-15

## 2017-03-15 VITALS
TEMPERATURE: 97.7 F | SYSTOLIC BLOOD PRESSURE: 156 MMHG | DIASTOLIC BLOOD PRESSURE: 67 MMHG | HEIGHT: 66 IN | RESPIRATION RATE: 18 BRPM | WEIGHT: 253.75 LBS | BODY MASS INDEX: 40.78 KG/M2 | OXYGEN SATURATION: 97 %

## 2017-03-15 PROCEDURE — 25000128 H RX IP 250 OP 636: Performed by: SURGERY

## 2017-03-15 PROCEDURE — 25000132 ZZH RX MED GY IP 250 OP 250 PS 637: Performed by: STUDENT IN AN ORGANIZED HEALTH CARE EDUCATION/TRAINING PROGRAM

## 2017-03-15 RX ORDER — ONDANSETRON 4 MG/1
4-8 TABLET, FILM COATED ORAL EVERY 8 HOURS PRN
Qty: 10 TABLET | Refills: 0 | Status: SHIPPED
Start: 2017-03-15 | End: 2017-03-27

## 2017-03-15 RX ORDER — URSODIOL 300 MG/1
300 CAPSULE ORAL 2 TIMES DAILY
Qty: 60 CAPSULE | Refills: 2 | Status: SHIPPED
Start: 2017-03-15

## 2017-03-15 RX ORDER — DIPHENHYDRAMINE HCL 25 MG
25 CAPSULE ORAL EVERY 6 HOURS PRN
Status: DISCONTINUED | OUTPATIENT
Start: 2017-03-15 | End: 2017-03-15 | Stop reason: HOSPADM

## 2017-03-15 RX ORDER — OXYCODONE HCL 5 MG/5 ML
5-10 SOLUTION, ORAL ORAL EVERY 4 HOURS PRN
Qty: 100 ML | Refills: 0 | Status: SHIPPED | OUTPATIENT
Start: 2017-03-15 | End: 2017-03-15

## 2017-03-15 RX ORDER — OXYCODONE HCL 5 MG/5 ML
5 SOLUTION, ORAL ORAL EVERY 4 HOURS PRN
Status: DISCONTINUED | OUTPATIENT
Start: 2017-03-15 | End: 2017-03-15 | Stop reason: HOSPADM

## 2017-03-15 RX ORDER — OXYCODONE HCL 5 MG/5 ML
5-10 SOLUTION, ORAL ORAL EVERY 4 HOURS PRN
Qty: 300 ML | Refills: 0 | Status: SHIPPED | OUTPATIENT
Start: 2017-03-15 | End: 2017-03-27

## 2017-03-15 RX ORDER — OXYCODONE HCL 5 MG/5 ML
5-10 SOLUTION, ORAL ORAL EVERY 4 HOURS PRN
Qty: 200 ML | Refills: 0 | Status: SHIPPED | OUTPATIENT
Start: 2017-03-15 | End: 2017-03-15

## 2017-03-15 RX ADMIN — KETOROLAC TROMETHAMINE 30 MG: 30 INJECTION, SOLUTION INTRAMUSCULAR at 07:55

## 2017-03-15 RX ADMIN — KETOROLAC TROMETHAMINE 30 MG: 30 INJECTION, SOLUTION INTRAMUSCULAR at 02:41

## 2017-03-15 RX ADMIN — ONDANSETRON 4 MG: 2 INJECTION INTRAMUSCULAR; INTRAVENOUS at 04:18

## 2017-03-15 RX ADMIN — OXYCODONE HYDROCHLORIDE 5 MG: 5 SOLUTION ORAL at 07:55

## 2017-03-15 RX ADMIN — ENOXAPARIN SODIUM 40 MG: 40 INJECTION SUBCUTANEOUS at 07:55

## 2017-03-15 RX ADMIN — OXYCODONE HYDROCHLORIDE 5 MG: 5 SOLUTION ORAL at 11:55

## 2017-03-15 RX ADMIN — DIPHENHYDRAMINE HYDROCHLORIDE 25 MG: 25 CAPSULE ORAL at 12:30

## 2017-03-15 ASSESSMENT — PAIN DESCRIPTION - DESCRIPTORS: DESCRIPTORS: ACHING;DISCOMFORT

## 2017-03-15 NOTE — DISCHARGE SUMMARY
[facility] Discharge Summary    Agatha Alejandro MRN: [id number]   YOB: 1995 Age: 21 year old     Date of Admission:  3/14/2017  Date of Discharge::  3/15/2017  2:47 PM  Admitting Physician:  Aime Fox MD  Discharge Physician:  Franko Becker MD  Primary Care Physician:         Makayla Campos          Admission Diagnoses:   Morbid Obesity           Discharge Diagnosis:   Same as above         Procedures:   - Laparoscopic sleeve gastrectomy, 3/14/2017 with Dr. Fox        Non-operative procedures:   None performed          Consultations:   NUTRITION SERVICES ADULT IP CONSULT  PHARMACY BARIATRIC IP CONSULT  RESPIRATORY CARE IP CONSULT            Brief History of Illness:   From the pre-operative H&P: Agatha Alejandro is a 21 year old female who presents for pre-operative H & P in preparation for  Laparoscopic Sleeve Gastrectomy on 2/17/17 by Dr. Fox in treatment of Morbid obesity. Surgery  at [facility]. History of morbid obesity. Desires weight loss. Met goal weight. Remote anesthesia. Can't recall details. No family history of bleeding, clotting disorders or complications with anesthesia           Hospital Course:   The patient underwent the above-stated procedure on 3/14/2017, which she tolerated well without complications. She was transferred to the floor for routine postoperative care and the remainder of her hospitalization was unremarkable.     At the time of discharge, she was tolerating a bariatric clear liquid diet, ambulating, voiding spontaneously without difficulty, and pain was controlled with oral pain medications. The patient was discharged home in stable and improved condition. She will follow up in clinic in one week.         Final Pathology Result:   Pending at time of discharge         Medications Prior to Admission:     Prescriptions Prior to Admission   Medication Sig Dispense Refill Last Dose     Multiple  Vitamins-Minerals (MULTIVITAMIN ADULT PO) Take by mouth every morning   Past Week at Unknown time     norgestim-eth estrad triphasic (TRINESSA, 28,) 0.18/0.215/0.25 MG-35 MCG TABS tablet Take 1 tablet by mouth daily (Patient taking differently: Take 1 tablet by mouth every morning ) 3 Package 3 3/14/2017 at 0600            Discharge Medications:     Current Discharge Medication List      START taking these medications    Details   ursodiol (ACTIGALL) 300 MG capsule Take 1 capsule (300 mg) by mouth 2 times daily  Qty: 60 capsule, Refills: 2    Associated Diagnoses: Morbid obesity due to excess calories (H)      oxyCODONE (ROXICODONE) 5 MG/5ML solution Take 5-10 mLs (5-10 mg) by mouth every 4 hours as needed for moderate to severe pain  Qty: 100 mL, Refills: 0    Associated Diagnoses: Morbid obesity due to excess calories (H)      ondansetron (ZOFRAN) 4 MG tablet Take 1-2 tablets (4-8 mg) by mouth every 8 hours as needed for nausea  Qty: 10 tablet, Refills: 0    Associated Diagnoses: Morbid obesity due to excess calories (H)         CONTINUE these medications which have NOT CHANGED    Details   Multiple Vitamins-Minerals (MULTIVITAMIN ADULT PO) Take by mouth every morning    Associated Diagnoses: Bariatric surgery status      norgestim-eth estrad triphasic (TRINESSA, 28,) 0.18/0.215/0.25 MG-35 MCG TABS tablet Take 1 tablet by mouth daily  Qty: 3 Package, Refills: 3    Associated Diagnoses: Well adolescent visit                  Day of Discharge Physical Exam:   Temp:  [97.2  F (36.2  C)-98.2  F (36.8  C)] 97.7  F (36.5  C)  Heart Rate:  [56-89] 80  Resp:  [14-18] 18  BP: (114-156)/(56-87) 156/67  SpO2:  [89 %-100 %] 97 %    General: AAOx4, NAD, lying comfortably in bed  CV: regular rate, warm, well-perfused  Pulm: no dyspnea, breathing comfortably on RA  Abd: soft, non-distended, appropriately tender to palpation; incisions with dressings intact, minimal spotting  Extremities: no edema  Neuro: moving all extremities  spontaneously without apparent deficit         Discharge Instructions and Follow-Up:     Reason for your hospital stay   Laparoscopic sleeve gastrectomy     Adult [facility] Follow-up and recommended labs and tests   Follow up with Dr. Fox , at (location with clinic name or city) [facility] ([facility]), within one week  to evaluate after surgery.    Appointments on [place] and/or [facility] (with [facility] or [facility] provider or service). Call [phone] if you haven't heard regarding these appointments within 7 days of discharge.     Activity   Your activity upon discharge: activity as tolerated     Discharge Instructions   Diet on discharge: bariatric clear liquids. Your diet will be advanced at your clinic visit.    No lifting >10 pounds for 4-6 weeks. Discuss with your surgeon at follow up appointment    May shower starting postoperative day #1 but no scrubbing incisions. No bathing or soaking incisions for 2 weeks or until incisions completely healed.  Wound care: Keep clean and dry. Steri strips or glue will fall off on their own.      After surgery it is ok to swallow medications smaller than 1/4 inch(size of pencil eraser) for all procedures.  If medication is larger than 1/4 inch then it will need to be crushed, cut or in liquid form for 1-2 months after surgery. This can be discussed with surgeon team at the 1 month follow up appointment and will depend on patient tolerance.    If you still have your gallbladder you will be given a prescription called Actigall or Ursodiol in a capsule form to prevent gallstones during rapid weight loss.  This capsule can be opened and put into your liquids or food (when your diet progresses). You will need to take this medications for 6 months after surgery.    Follow-up with your surgery team in 1-2 weeks. If this appointment was not previous made for you please call [phone] and choose option #1 to schedule your follow-up appointment.     You will receive a call  from our clinic nurse after surgery.  If you have any questions and would like to speak with a nurse please call [phone] and choose option #3 to speak with a triage nurse.    Call [phone] and ask to speak with surgery resident if you are having troubles in the evenings, at night, or on weekends. Please call if you experience increasing abdominal pain, nausea, vomiting, increasing drainage from your wounds, chills, or fever >101.5    Take stool softener while taking narcotic pain medication.  No driving for at least 12 hours after taking narcotic pain medication.    Appointments located at [facility] clinic:  [address] [facility]  [address]  [place] [address]     Full Code     Diet   Follow this diet upon discharge: Bariatric clear liquids only until seen in clinic.             Home Health Care:   Not needed            Discharge Disposition:   Discharged to home      Condition at discharge: Good      - - - - - - - - - - - -  Franko Becker MD  PGY-1, General Surgery  [facility]  Pager: [phone]

## 2017-03-15 NOTE — PLAN OF CARE
Problem: Goal Outcome Summary  Goal: Goal Outcome Summary  Outcome: Improving  Afeb, VSS, PCA pain with 0.3 Q 10 min & toradol with good pain relief & Zofran/ scopolamine patch for nausea with good relief. Ice chips & swabs for dry mouth. Incision with Perma pore & cdi. Voiding adequately, hypo BS, no flatus & no BM. Ambulated hallway with stand by assist. LR infusing per order. Continue POC.

## 2017-03-15 NOTE — PLAN OF CARE
Problem: Individualization  Goal: Patient Preferences  Afeb, vitals stable, 02 sats 89% on room air, placed 2LPM/nc, sats now in mid to upper 90's, states pain is manageable, on dilaudid pca with relief, c/o nausea x1, zofran with relief, abd lap sites dry and intact with scant amt of old drng, belly soft, with few bowel sounds, lungs clear, using IS to 1500ml, iv infusing, up to bathroom, voiding in good amts, npo, possible start cld this am, possible dc later today if diet goes well, appeared to sleep between cares, offers no further c/o

## 2017-03-15 NOTE — PHARMACY-CONSULT NOTE
Bariatric Consult    Medications evaluated as requested per Bariatric Consult. Medications available as liquid formulations have been dispensed when possible.    No medication changes were needed based on the Bariatric Medication Management Policy.    The pharmacist will continue to follow as new medications are ordered.

## 2017-03-15 NOTE — PROGRESS NOTES
"[facility]  \"Hello, my name is Lilo Medley , and I am calling from the [facility].  I want to check in and see how you are doing, after leaving the hospital.  You may also receive a call from your Care Coordinator (care team), but I want to make sure you don t have any urgent needs.  I have a couple questions to review with you:     Post-Discharge Outreach                                                    Agatha Alejandro is a 21 year old female     Follow-up Appointments           Adult [facility] Follow-up and recommended labs and tests       Follow up with Dr. Fox , at (location with clinic name or city) [facility] (4[facility]), within one week to evaluate after surgery.     Appointments on [place] and/or [facility] (with [facility] or [facility] provider or service). Call [phone] if you haven't heard regarding these appointments within 7 days of discharge.                       Your next 10 appointments already scheduled            Mar 27, 2017 9:40 AM CDT   (Arrive by 9:25 AM)   RETURN BARIATRIC SURGERY with Aime Fox MD   [facility] Surgical [facility] ([facility])     [address] [place] [address]   [phone]                  Mar 27, 2017 10:00 AM CDT   NUTRITION VISIT with Rama Baker RD   [facility] ([facility] [place])     [address] [place] [address]   [phone]                  Apr 20, 2017 9:40 AM CDT   (Arrive by 9:25 AM)   RETURN BARIATRIC SURGERY with Aime Fox MD   [facility] ([facility] [place])     [address] [place] [address]   [phone]                  Apr 20, 2017 10:00 AM CDT   NUTRITION VISIT with Rama Baker RD   [facility] Surgical Weight Management ([facility] [place])               Care " Team:    Patient Care Team       Relationship Specialty Notifications Start End    Makayla Campos MD PCP - General   [birth year]     Phone: [phone] Fax: [phone]         [facility] [address] [place] [address] [address]  [address] [facility] [address]    Shantelle Da Silva, RN Nurse Coordinator Bariatric  12/9/16     Comment:  pre WLS RN. Fax: [phone]    Phone: [phone] Pager: [phone]         [facility] [address]            Transition of Care Review                                                      Did you have a surgery or procedure during your hospital visit? Yes   If yes, do you have any of the following:     Signs of infection:  No    Pain:  Yes     Pain Scale (0-10) 4/10     Location: Surg sites    Wound/incision concerns? No    Do you have all of your medications/refills?  Yes    Are you having any side effects or questions about your medication(s)? No    Do you have any new or worsening symptoms?  No    Do you have any future appointments scheduled?   Yes             Plan                                                      Thanks for your time.  Your Care Coordinator may follow-up within the next couple days.  In the meantime if you have questions, concerns or problems call your care team.        Lilo Medley

## 2017-03-17 ENCOUNTER — CARE COORDINATION (OUTPATIENT)
Dept: SURGERY | Facility: CLINIC | Age: 22
End: 2017-03-17

## 2017-03-17 LAB — COPATH REPORT: NORMAL

## 2017-03-17 NOTE — PROGRESS NOTES
RN Post Op Care Coordination Note    Ms. Agatha Alejandro is a 21 year old female who underwent Sleeve Gastrectomy on 3/14/17 with Dr. Fox.  Spoke with Patient.    Support  Family member assisting with care     Health Status  Fevers/chills: Patient denies any fever or chills.  Nausea/Vomiting: Patient denies nausea/vomiting.  Eating/drinking: Tolerating full liquids  Bowel habits: Patient reports having a normal bowel movement.  Last BM: yesterday   Urinary: Voiding normally Symptoms of UTI: none  Drains (DANIEL): N/A  Incisions: Patient denies any signs and symptoms of infection.  Vascular Assessment: Patient reports good color motion sensitivity with no numbness or tingling.  Pain: Patient reports pain as a 4 out of 10.  The pain is located abdomen. Pain is managed with Narcotics  Tylenol.    Advised patient to take ES Tylenol 2 tablets every 6 hours while symptoms last, not to exceed 6 caplets in 24 hours.     Discharged with Actigall: yes  Is Rx needed: no  (Per protocol if patient still has gallbladder: Actigall 300 mg TID capsules x 6 months. (Can open capsule and add to food/liquids)   Activity/Restrictions  Following restrictions outlined by surgeon.      Whom and When to Call  Patient acknowledges understanding of how to manage any medication changes and when to seek medical care.     Patient advised that if after hour medical concerns arise to please call [phone] and choose option 4 to speak to the physician on call.       Follow up appointment scheduled for 3/27/17 with Dr. Fox.    Pt doing well and appreciated the phone call.    Megan Balderas RN

## 2017-03-22 ENCOUNTER — TELEPHONE (OUTPATIENT)
Dept: NUTRITION | Facility: CLINIC | Age: 22
End: 2017-03-22

## 2017-03-22 NOTE — TELEPHONE ENCOUNTER
"Nutrition Brief Note:     Pt called 1 week post-SG with Dr. Fox questioning whether or not it was appropriate for her to advance her diet to low-fat full liquids.  Pt stated that the diet guidelines booklet and RD education provided prior to surgery indicated that she should be on the bariatric low-fat full liquid diet; however, she did not advance from the bariatric clear liquid diet yet due to a doctor (not Dr. Fox) telling her prior to discharge to stay on \"the chicken broth\" diet.  Writer apologized for the confusion and clarified that it is appropriate for her to follow the diet guidelines booklet which indicates that she should be on the bariatric low-fat full liquid diet.  Writer informed Pt that Dr. Fox officially approved that diet advancement protocol stated in the diet guidelines booklet (\"Diet Guidelines After Bariatric Surgery\").  Writer reviewed the bariatric low-fat full diet and importance of remaining hydrated.   Pt verbalized understanding.     Lilo Rico, MS, RDN, LDN, CLT  Pager: [phone]    "

## 2017-03-27 ENCOUNTER — ALLIED HEALTH/NURSE VISIT (OUTPATIENT)
Dept: SURGERY | Facility: CLINIC | Age: 22
End: 2017-03-27

## 2017-03-27 ENCOUNTER — OFFICE VISIT (OUTPATIENT)
Dept: SURGERY | Facility: CLINIC | Age: 22
End: 2017-03-27

## 2017-03-27 VITALS
HEART RATE: 82 BPM | BODY MASS INDEX: 38.38 KG/M2 | TEMPERATURE: 98.8 F | WEIGHT: 238.8 LBS | HEIGHT: 66 IN | DIASTOLIC BLOOD PRESSURE: 89 MMHG | SYSTOLIC BLOOD PRESSURE: 129 MMHG | OXYGEN SATURATION: 97 %

## 2017-03-27 DIAGNOSIS — R19.7 DIARRHEA, UNSPECIFIED TYPE: Primary | ICD-10-CM

## 2017-03-27 ASSESSMENT — PAIN SCALES - GENERAL: PAINLEVEL: NO PAIN (0)

## 2017-03-27 NOTE — PROGRESS NOTES
"Postoperative bariatric surgery visit.    Patient underwent sleeve gastrectomy 2 weeks ago on 3/14/17.      Tolerating liquids: tolerated low fat liquid diet of yogurt, water, protein shakes  Lightheadedness: denies  Abdominal pain: had some epigastric pain post of, which as since resolved  Bowel movements: regular daily, watery diarrhea  Fevers/shakes/chills: denies    /89 (BP Location: Left arm, Patient Position: Chair, Cuff Size: Adult Large)  Pulse 82  Temp 98.8  F (37.1  C) (Oral)  Ht 1.676 m (5' 6\")  Wt 108.3 kg (238 lb 12.8 oz)  LMP 03/13/2017 (Exact Date)  SpO2 97%  BMI 38.54 kg/m2  NAD  Overall looks good  Incisions c/d/i    Plan:  1. RD visit today.  2. Start vitamin supplements per RD directions.  3. Advance diet per RD directions.  4. Will place order for C. Diff stool testing given her diarrhea.  Patient instructed that if her diarrhea does not improve with the pureed diet starting today, that she should report back to give a stool sample.  5. Follow-up: 4 weeks.    Aime Fox MD  Surgery  [phone] (hospital )  [phone] (clinic nurses)                  Patient seen, examined and discussed with Dr. Fox.    "

## 2017-03-27 NOTE — PROGRESS NOTES
"Nutrition Assessment  Reason For Visit:  Agatha Alejandro is a 21 year old female presenting today for nutrition follow-up, 1 week s/p SG. Pt referred by Dr. Fox (3/27/17).    Anthropometrics  Initial Consult Weight: 270.2 lbs  Day of Surgery Weight: 253.8 lbs  Current Weight: 238.8 lbs  Weight loss: -31.4 lbs from initial consult; -15 lbs from day of surgery    Nutrition History  Current Vitamins/Minerals: None, Pt plans to restart her gummy multivitamins today.    Pt reports consuming and tolerating bariatric clear and low-fat full liquid diets. Pt is drinking 32oz of water daily and working to increase to 48-64oz/day.    Nutrition Prescription:  Grams Protein: 60 (minimum)  Amount of Fluid: 48-64 oz    Nutrition Diagnosis  Food and nutrition-related knowledge deficit r/t lack of prior exposure to diet advancements beyond bariatric low-fat full liquid diet aeb pt unable to verbalize understanding of bariatric pureed and soft diets.    Intervention  Intervention At Appointment:  Materials/education provided on bariatric pureed and soft diets, protein intake, fluid intake, eating pace, portion control, avoiding excess sugar and fat, recommended vitamin/mineral supplements. Provided pt with \"Pureed Pleasures\" handout, list of goals, and RD contact information.     Patient Understanding: Good  Expected Compliance: Good    Goals:  1) Follow bariatric low-fat full liquid diet through day 13 post-op, then to progress to pureed diet x 2 weeks.  If tolerating, may advance on day 29 post-op to bariatric soft diet.   2) Work towards 60 gm protein/day.  3) Consume 48-64 oz fluids daily- between meals.  4) Eat slowly (>20 min/meal), chewing well to smooth consistency once on the bariatric soft diet.  5) Limit portions to 1/2 cup/meal.  6) Start chewable/liquid multivitamin/minerals twice daily.    Follow-Up: 3 weeks or prn    Time spent with patient: 15 minutes    Rama Baker RD, LD  Pager: [phone]      "

## 2017-03-27 NOTE — LETTER
"3/27/2017       RE: Agatha Alejandro  [address] [place] [address]     Dear Colleague,    Thank you for referring your patient, Agatha Alejandro, to the [facility] SURGICAL WEIGHT MANAGEMENT at [facility]. Please see a copy of my visit note below.    Postoperative bariatric surgery visit.    Patient underwent sleeve gastrectomy 2 weeks ago on 3/14/17.      Tolerating liquids: tolerated low fat liquid diet of yogurt, water, protein shakes  Lightheadedness: denies  Abdominal pain: had some epigastric pain post of, which as since resolved  Bowel movements: regular daily, watery diarrhea  Fevers/shakes/chills: denies    /89 (BP Location: Left arm, Patient Position: Chair, Cuff Size: Adult Large)  Pulse 82  Temp 98.8  F (37.1  C) (Oral)  Ht 1.676 m (5' 6\")  Wt 108.3 kg (238 lb 12.8 oz)  LMP 03/13/2017 (Exact Date)  SpO2 97%  BMI 38.54 kg/m2  NAD  Overall looks good  Incisions c/d/i    Plan:  1. RD visit today.  2. Start vitamin supplements per RD directions.  3. Advance diet per RD directions.  4. Will place order for C. Diff stool testing given her diarrhea.  Patient instructed that if her diarrhea does not improve with the pureed diet starting today, that she should report back to give a stool sample.  5. Follow-up: 4 weeks.    Tavo Jeffery  Surgery Intern    Patient seen, examined and discussed with Dr. Fox.    Again, thank you for allowing me to participate in the care of your patient.      Sincerely,    Aime Fox MD      "

## 2017-03-27 NOTE — PATIENT INSTRUCTIONS
Goals:  1) Follow bariatric low-fat full liquid diet through day 13 post-op, then to progress to pureed diet x 2 weeks.  If tolerating, may advance on day 29 post-op to bariatric soft diet.   2) Work towards 60 gm protein/day.  3) Consume 48-64 oz fluids daily- between meals.  4) Eat slowly (>20 min/meal), chewing well to smooth consistency once on the bariatric soft diet.  5) Limit portions to 1/2 cup/meal.  6) Start chewable/liquid multivitamin/minerals twice daily.    Aide Baker RD, LD  [phone]

## 2017-03-27 NOTE — NURSING NOTE
"(   Chief Complaint   Patient presents with     Follow Up For     return bariatric surgery    )    ( Weight: 238 lb 12.8 oz )  ( Height: 5' 6\" )  ( BMI (Calculated): 38.62 )  (   )  (   )  (   )  (   )  (   )  (   )    ( BP: 129/89 )  (   )  ( Temp: 98.8  F (37.1  C) )  ( Temp src: Oral )  ( Pulse: 82 )  (   )  ( SpO2: 97 % )    (   Patient Active Problem List   Diagnosis     Obesity     Elevated blood pressure reading without diagnosis of hypertension     Mild major depression (H)     History of methamphetamine abuse     Anxiety     Back pain     Morbid obesity (H)    )  (   Current Outpatient Prescriptions   Medication Sig Dispense Refill     ursodiol (ACTIGALL) 300 MG capsule Take 1 capsule (300 mg) by mouth 2 times daily 60 capsule 2     ondansetron (ZOFRAN) 4 MG tablet Take 1-2 tablets (4-8 mg) by mouth every 8 hours as needed for nausea 10 tablet 0     oxyCODONE (ROXICODONE) 5 MG/5ML solution Take 5-10 mLs (5-10 mg) by mouth every 4 hours as needed for moderate to severe pain 300 mL 0     Multiple Vitamins-Minerals (MULTIVITAMIN ADULT PO) Take by mouth every morning       norgestim-eth estrad triphasic (TRINESSA, 28,) 0.18/0.215/0.25 MG-35 MCG TABS tablet Take 1 tablet by mouth daily (Patient taking differently: Take 1 tablet by mouth every morning ) 3 Package 3    )  ( Diabetes Eval:    )    ( Pain Eval:  No Pain (0) )      History   Smoking Status     Never Smoker   Smokeless Tobacco     Never Used    )    ( Signed By:  Karley Stoddard; March 27, 2017; 9:56 AM )  "

## 2017-03-27 NOTE — MR AVS SNAPSHOT
MRN:[id number]                      After Visit Summary   3/27/2017    Agatha Alejandro    MRN: [id number]           Visit Information        Provider Department      3/27/2017 10:00 AM Rama Baker RD [facility] Surgical Weight Management        Your next 10 appointments already scheduled     Apr 20, 2017  9:40 AM CDT   (Arrive by 9:25 AM)   RETURN BARIATRIC SURGERY with Aime Fox MD   [facility] Surgical Weight Management ([facility])    [address] [place] [id number]   [phone]            Apr 20, 2017 10:00 AM CDT   NUTRITION VISIT with Rama Baker RD   [facility] Surgical Weight Management ([facility])    [address] [place] [id number] [phone]              Care Instructions    Goals:  1) Follow bariatric low-fat full liquid diet through day 13 post-op, then to progress to pureed diet x 2 weeks.  If tolerating, may advance on day 29 post-op to bariatric soft diet.   2) Work towards 60 gm protein/day.  3) Consume 48-64 oz fluids daily- between meals.  4) Eat slowly (>20 min/meal), chewing well to smooth consistency once on the bariatric soft diet.  5) Limit portions to 1/2 cup/meal.  6) Start chewable/liquid multivitamin/minerals twice daily.    Aide Baker RD,   [phone]         [email or web address] Information     [email or web address] gives you secure access to your electronic health record. If you see a primary care provider, you can also send messages to your care team and make appointments. If you have questions, please call your primary care clinic.  If you do not have a primary care provider, please call [phone] and they will assist you.      [email or web address] is an electronic gateway that provides easy, online access to your medical records. With [email or web address], you can request a clinic appointment, read your test results, renew a prescription or communicate with your care team.     To  access your existing account, please contact your [facility] or call [phone] for assistance.        Care EveryWhere ID     This is your Care EveryWhere ID. This could be used by other organizations to access your [facility] medical records  [id number]

## 2017-04-20 ENCOUNTER — ALLIED HEALTH/NURSE VISIT (OUTPATIENT)
Dept: SURGERY | Facility: CLINIC | Age: 22
End: 2017-04-20

## 2017-04-20 ENCOUNTER — OFFICE VISIT (OUTPATIENT)
Dept: SURGERY | Facility: CLINIC | Age: 22
End: 2017-04-20

## 2017-04-20 VITALS
SYSTOLIC BLOOD PRESSURE: 137 MMHG | DIASTOLIC BLOOD PRESSURE: 78 MMHG | OXYGEN SATURATION: 97 % | HEART RATE: 89 BPM | HEIGHT: 66 IN | TEMPERATURE: 98.7 F | BODY MASS INDEX: 37.97 KG/M2 | WEIGHT: 236.3 LBS

## 2017-04-20 DIAGNOSIS — K31.83 ACHLORHYDRIA, GASTRIC: Primary | ICD-10-CM

## 2017-04-20 NOTE — NURSING NOTE
"(   Chief Complaint   Patient presents with     RECHECK     LSG 3/14/17 1 mo + follow up    )    ( Weight: 236 lb 4.8 oz )  ( Height: 5' 6\" )  ( BMI (Calculated): 38.22 )  ( Initial Weight: 270 lb 3.2 oz )  ( Cumulative weight loss (lbs): 33.9 )  ( Last Visits Weight: 258 lb 6.4 oz )  ( Wt change since last visit (lbs): -22.1 )  (   )  (   )    ( BP: 137/78 )  ( Site: Arm, upper left )  ( Temp: 98.7  F (37.1  C) )  ( Temp src: Oral )  ( Pulse: 89 )  (   )  ( SpO2: 97 % )    (   Patient Active Problem List   Diagnosis     Obesity     Elevated blood pressure reading without diagnosis of hypertension     Mild major depression (H)     History of methamphetamine abuse     Anxiety     Back pain     Morbid obesity (H)    )  (   Current Outpatient Prescriptions   Medication Sig Dispense Refill     ursodiol (ACTIGALL) 300 MG capsule Take 1 capsule (300 mg) by mouth 2 times daily 60 capsule 2     Multiple Vitamins-Minerals (MULTIVITAMIN ADULT PO) Take by mouth every morning       norgestim-eth estrad triphasic (TRINESSA, 28,) 0.18/0.215/0.25 MG-35 MCG TABS tablet Take 1 tablet by mouth daily (Patient taking differently: Take 1 tablet by mouth every morning ) 3 Package 3    )  ( Diabetes Eval:    )    ( Pain Eval:  Data Unavailable )    ( Wound Eval:       )    (   History   Smoking Status     Never Smoker   Smokeless Tobacco     Never Used    )    ( Signed By:  Fernando Evans; April 20, 2017; 9:04 AM )    "

## 2017-04-20 NOTE — PROGRESS NOTES
Nutrition Reassessment  Reason For Visit:  Agatha Alejandro is a 21 year old female presenting today for nutrition follow-up, 1 month s/p SG. Pt referred by Dr. Fox (4/20/17).    Anthropometrics:  Initial Consult Weight: 270.2 lbs  Day of Surgery Weight (3/14/17): 253.8 lbs  Current Weight: 236.3 lbs  Weight loss: -33.9 lbs from initial consult; -17.5 lbs from day of surgery    Current Vitamins/Minerals: MVI/minerals BID, Iron    Nutrition History:    Progress with Previous Goals:  1) Follow bariatric low-fat full liquid diet through day 13 post-op, then to progress to pureed diet x 2 weeks. If tolerating, may advance on day 29 post-op to bariatric soft diet. - Met and continues.  2) Work towards 60 gm protein/day. - Met and continues.  3) Consume 48-64 oz fluids daily- between meals. - Met and continues.  4) Eat slowly (>20 min/meal), chewing well to smooth consistency once on the bariatric soft diet. - Met and continues.  5) Limit portions to 1/2 cup/meal. - Met and continues.  6) Start chewable/liquid multivitamin/minerals twice daily. - Met.    Nutrition Prescription:  Grams Protein: 60 (minimum)  Amount of Fluid: 48-64 oz    Nutrition Diagnosis  Previous: Food and nutrition-related knowledge deficit r/t lack of prior exposure to diet advancements beyond bariatric low-fat full liquid diet aeb pt unable to verbalize understanding of bariatric pureed and soft diets. - resolved.    Current: Food and nutrition-related knowledge deficit r/t lack of prior exposure to diet advancements beyond bariatric pureed diet aeb pt unable to verbalize full understanding of bariatric soft and regular consistency diets.    Intervention  Materials/Education provided on bariatric soft and regular consistency diets, protein intake, fluid intake, eating pace, chewing foods well, portion control, sugar/fat intake, recommended vitamin/mineral supplements. Gave encouragement and support. Provided pt with list of goals and RD contact  information.     Patient Understanding: Good  Expected Compliance: Good    Goals:  1) Follow soft diet for 3 weeks, then to progress to bariatric regular diet. Try 1 new food at a time.   2) Consume 60 grams of protein/day.  3) Sip on 48-64 oz of fluids/day- between meals only.  4) Eat slowly (>20 min/meal), chewing foods well (to applesauce-like consistency).  5) Limit portions to 1/2 cup/meal.  6) -Take the following after a Sleeve Gastrectomy:    Multivitamin/minerals: adult dose 2 times daily    Calcium Citrate containing vitamin D: 500 mg 3 times daily or 600 mg 2 times daily    Vitamin B12: sublingual form of at least 500 mcg daily or injection of 1000 mcg monthly    Iron: 45-60mg daily       Follow-Up: prn    Time spent with patient: 15 minutes    Rama Baker RD, LD  Pager: [phone]

## 2017-04-20 NOTE — PROGRESS NOTES
Postoperative bariatric surgery visit.    Patient underwent sleeve gastrectomy 4 weeks ago. Was having some diarrhea post-op and we discussed possibility of C diff, however she did not return to give sample. She feels that this is mostly related to having milk and ice cream. She has stopped this and her symptoms resolved. Otherwise doing well and ready to return to work.     Tolerating liquids: Yes, outside milk based products  Lightheadedness: No  Abdominal pain: No  Bowel movements: Liquid after milk, otherwise formed  Fevers/shakes/chills: No    LMP 03/13/2017 (Exact Date)  NAD  Overall looks well  Incisions c/d/i; well healed, no bruising    Plan: Doing well, no concerns  1. RD visit today.  2. Start vitamin supplements per RD directions.  3. Advance diet per RD directions.  4. Will provide letter to return to work with no restrictions.   5. Follow-up: at 3 months    Seen with staff    Aime Fox MD  Surgery  [phone] (hospital )  [phone] (clinic nurses)

## 2017-04-20 NOTE — MR AVS SNAPSHOT
After Visit Summary   4/20/2017    Agatha Alejandro    MRN: [id number]           Patient Information     Date Of Birth          1995        Visit Information        Provider Department      4/20/2017 9:40 AM Aime Fox MD [facility] Surgical Weight Management        Today's Diagnoses     Achlorhydria, gastric    -  1      Care Instructions    It was a pleasure meeting with you today.     Thank you for allowing us the privilege of caring for you. We hope we provided you with the excellent service you deserve.     Please let us know if there is anything else we can do for you so that we can be sure you are leaving completely satisfied with your care experience.      You saw Dr Fox today.     Instructions per today's visit: 3 mths      To schedule appointments with our team, please call [phone] option #1    Please call during clinic hours Monday through Friday 8:00a - 4:00p if you have questions or you can contact us via abusix at anytime.      Nurses: [phone] Option # 3 for nurse advice line.  Fax: [phone]  Surgery Scheduler: [phone]    Please call the hospital at [phone] to speak with our on call MDs if you have urgent needs after hours, during weekends, or holidays.          Follow-ups after your visit        Follow-up notes from your care team     Return in about 3 months (around 7/20/2017).      Who to contact     Please call your clinic at [phone] to:    Ask questions about your health    Make or cancel appointments    Discuss your medicines    Learn about your test results    Speak to your doctor   If you have compliments or concerns about an experience at your clinic, or if you wish to file a complaint, please contact [facility] Patient Relations at [phone] or email us at [email or web address]         Additional Information About Your Visit        [employer] Information     abusix gives you secure access to  "your electronic health record. If you see a primary care provider, you can also send messages to your care team and make appointments. If you have questions, please call your primary care clinic.  If you do not have a primary care provider, please call [phone] and they will assist you.      EC[employer]ality is an electronic gateway that provides easy, online access to your medical records. With [employer], you can request a clinic appointment, read your test results, renew a prescription or communicate with your care team.     To access your existing account, please contact your [facility] or call [phone] for assistance.        Care EveryWhere ID     This is your Care EveryWhere ID. This could be used by other organizations to access your [facility] medical records  [id number]        Your Vitals Were     Pulse Temperature Height Last Period Pulse Oximetry BMI (Body Mass Index)    89 98.7  F (37.1  C) (Oral) 1.676 m (5' 6\") 03/13/2017 (Exact Date) 97% 38.14 kg/m2       Blood Pressure from Last 3 Encounters:   04/20/17 137/78   03/27/17 129/89   03/15/17 156/67    Weight from Last 3 Encounters:   04/20/17 107.2 kg (236 lb 4.8 oz)   03/27/17 108.3 kg (238 lb 12.8 oz)   03/14/17 115.1 kg (253 lb 12 oz)              Today, you had the following     No orders found for display         Today's Medication Changes          These changes are accurate as of: 4/20/17 11:40 AM.  If you have any questions, ask your nurse or doctor.               These medicines have changed or have updated prescriptions.        Dose/Directions    norgestim-eth estrad triphasic 0.18/0.215/0.25 MG-35 MCG per tablet   Commonly known as:  TRINESSA (28)   This may have changed:  when to take this   Used for:  Well adolescent visit        Dose:  1 tablet   Take 1 tablet by mouth daily   Quantity:  3 Package   Refills:  3                Primary Care Provider Office Phone # Fax #    Makayla Campos [phone]    "    [facility] [place] [address]  [place] [facility] [address]        Thank you!     Thank you for choosing [facility] SURGICAL WEIGHT MANAGEMENT  for your care. Our goal is always to provide you with excellent care. Hearing back from our patients is one way we can continue to improve our services. Please take a few minutes to complete the written survey that you may receive in the mail after your visit with us. Thank you!             Your Updated Medication List - Protect others around you: Learn how to safely use, store and throw away your medicines at www.disposemymeds.org.          This list is accurate as of: 4/20/17 11:40 AM.  Always use your most recent med list.                   Brand Name Dispense Instructions for use    MULTIVITAMIN ADULT PO      Take by mouth every morning       norgestim-eth estrad triphasic 0.18/0.215/0.25 MG-35 MCG per tablet    TRINESSA (28)    3 Package    Take 1 tablet by mouth daily       ursodiol 300 MG capsule    ACTIGALL    60 capsule    Take 1 capsule (300 mg) by mouth 2 times daily

## 2017-04-20 NOTE — PATIENT INSTRUCTIONS
It was a pleasure meeting with you today.     Thank you for allowing us the privilege of caring for you. We hope we provided you with the excellent service you deserve.     Please let us know if there is anything else we can do for you so that we can be sure you are leaving completely satisfied with your care experience.      You saw Dr Fox today.     Instructions per today's visit: 3 mths      To schedule appointments with our team, please call [phone] option #1    Please call during clinic hours Monday through Friday 8:00a - 4:00p if you have questions or you can contact us via [employer] at anytime.      Nurses: [phone] Option # 3 for nurse advice line.  Fax: [phone]  Surgery Scheduler: [phone]    Please call the hospital at [phone] to speak with our on call MDs if you have urgent needs after hours, during weekends, or holidays.

## 2017-04-20 NOTE — LETTER
[facility] SURGICAL WEIGHT MANAGEMENT  [address] [address]  Phone: [phone]  Fax: [phone]    April 20, 2017        Agatha YAN Flavia  [address] [place] [address]          To whom it may concern:    RE: Agatha YAN Flavia    Patient may return to work April 25th, 2017 with the following:  No working or lifting restrictions.    Please contact me for questions or concerns.      Sincerely,        Aime Fox MD

## 2017-04-20 NOTE — PATIENT INSTRUCTIONS
Goals:  1) Follow soft diet for 3 weeks, then to progress to bariatric regular diet. Try 1 new food at a time.   2) Consume 60 grams of protein/day.  3) Sip on 48-64 oz of fluids/day- between meals only.  4) Eat slowly (>20 min/meal), chewing foods well (to applesauce-like consistency).  5) Limit portions to 1/2 cup/meal.  6) -Take the following after a Sleeve Gastrectomy:    Multivitamin/minerals: adult dose 2 times daily    Calcium Citrate containing vitamin D: 500 mg 3 times daily or 600 mg 2 times daily    Vitamin B12: sublingual form of at least 500 mcg daily or injection of 1000 mcg monthly    Iron: 45-60mg daily    Aide Baker RD, LD  [phone]

## 2017-04-20 NOTE — LETTER
4/20/2017       RE: Agatha Alejandro  [address] [place] [id number]     Dear Colleague,    Thank you for referring your patient, Agatha Alejandro, to the [facility] SURGICAL WEIGHT MANAGEMENT at [facility]. Please see a copy of my visit note below.    Postoperative bariatric surgery visit.    Patient underwent sleeve gastrectomy 4 weeks ago. Was having some diarrhea post-op and we discussed possibility of C diff, however she did not return to give sample. She feels that this is mostly related to having milk and ice cream. She has stopped this and her symptoms resolved. Otherwise doing well and ready to return to work.     Tolerating liquids: Yes, outside milk based products  Lightheadedness: No  Abdominal pain: No  Bowel movements: Liquid after milk, otherwise formed  Fevers/shakes/chills: No    LMP 03/13/2017 (Exact Date)  NAD  Overall looks well  Incisions c/d/i; well healed, no bruising    Plan: Doing well, no concerns  1. RD visit today.  2. Start vitamin supplements per RD directions.  3. Advance diet per RD directions.  4. Will provide letter to return to work with no restrictions.   5. Follow-up: at 3 months    Seen with staff    Aime Fox MD  Surgery  [phone] (hospital )  [phone] (clinic nurses)

## 2017-04-20 NOTE — MR AVS SNAPSHOT
MRN:[id number]                      After Visit Summary   4/20/2017    Agatha Alejandro    MRN: [id number]           Visit Information        Provider Department      4/20/2017 10:00 AM Rama Baker RD [facility] Surgical Weight Management        Care Instructions    Goals:  1) Follow soft diet for 3 weeks, then to progress to bariatric regular diet. Try 1 new food at a time.   2) Consume 60 grams of protein/day.  3) Sip on 48-64 oz of fluids/day- between meals only.  4) Eat slowly (>20 min/meal), chewing foods well (to applesauce-like consistency).  5) Limit portions to 1/2 cup/meal.  6) -Take the following after a Sleeve Gastrectomy:    Multivitamin/minerals: adult dose 2 times daily    Calcium Citrate containing vitamin D: 500 mg 3 times daily or 600 mg 2 times daily    Vitamin B12: sublingual form of at least 500 mcg daily or injection of 1000 mcg monthly    Iron: 45-60mg daily    Aide Baker RD, LD  [phone]         bluebottlebiz Information     bluebottlebiz gives you secure access to your electronic health record. If you see a primary care provider, you can also send messages to your care team and make appointments. If you have questions, please call your primary care clinic.  If you do not have a primary care provider, please call [phone] and they will assist you.      bluebottlebiz is an electronic gateway that provides easy, online access to your medical records. With bluebottlebiz, you can request a clinic appointment, read your test results, renew a prescription or communicate with your care team.     To access your existing account, please contact your [facility] or call [phone] for assistance.        Care EveryWhere ID     This is your Care EveryWhere ID. This could be used by other organizations to access your [facility] medical records  [id number]

## 2017-07-22 ENCOUNTER — HEALTH MAINTENANCE LETTER (OUTPATIENT)
Age: 22
End: 2017-07-22

## 2018-02-28 ENCOUNTER — CARE COORDINATION (OUTPATIENT)
Dept: SURGERY | Facility: CLINIC | Age: 23
End: 2018-02-28

## 2019-11-04 ENCOUNTER — HEALTH MAINTENANCE LETTER (OUTPATIENT)
Age: 24
End: 2019-11-04

## 2020-11-16 ENCOUNTER — HEALTH MAINTENANCE LETTER (OUTPATIENT)
Age: 25
End: 2020-11-16

## 2021-09-18 ENCOUNTER — HEALTH MAINTENANCE LETTER (OUTPATIENT)
Age: 26
End: 2021-09-18

## 2022-01-08 ENCOUNTER — HEALTH MAINTENANCE LETTER (OUTPATIENT)
Age: 27
End: 2022-01-08

## 2022-11-20 ENCOUNTER — HEALTH MAINTENANCE LETTER (OUTPATIENT)
Age: 27
End: 2022-11-20

## 2023-04-15 ENCOUNTER — HEALTH MAINTENANCE LETTER (OUTPATIENT)
Age: 28
End: 2023-04-15

## 2024-08-01 NOTE — PROGRESS NOTES
"../67 (BP Location: Left arm)  Temp 97.7  F (36.5  C)  Resp 18  Ht 1.676 m (5' 5.98\")  Wt 115.1 kg (253 lb 12 oz)  LMP   SpO2 97%  BMI 40.98 kg/m2   A&O. Ready for dc home this afternoon. Pt comfortable on oral oxy 5mg every 4 hrs and was sleeping after given 25 mg of benadryl for slight itchy feeling. DC 'd peripheral IV. Pt dc orders where given and reviewed and pt will  meds at dc pharmacy upon discharge. Pt and her boyfriends left 7B for the dc Pharmacy and then for lobby to go home. Pt left with all her personal items.  "
"../67 (BP Location: Left arm)  Temp 97.7  F (36.5  C)  Resp 18  Ht 1.676 m (5' 5.98\")  Wt 115.1 kg (253 lb 12 oz)  LMP   SpO2 97%  BMI 40.98 kg/m2  A&O Up ad mary and walked in hallway. [place] flushed and stated her face was slightly itchy around 1100 which was 3 hrs after her 5mg oxy that was given at 0800. Spoke with pharmacist and paged MD about this information. VSS. Pt feeling better and did want another dose of 5 mg oxy at noon for her incisional pain. Pt taking in only small amount of clear liquids. Continue with plan of cares and medicate per orders.  "
"Surgery Brief Post-Op Check  Agatha Alejandro MRN: [id number]    S: Patient reports some throat discomfort, like it is being pulled on. Pain otherwise controlled with PCA. Hasn't urinated yet. Denies headache, chest pain, shortness of breath, nausea or emesis.    O: /56  Temp 97.7  F (36.5  C)  Resp 16  Ht 1.676 m (5' 5.98\")  Wt 115.1 kg (253 lb 12 oz)  LMP   SpO2 96%  BMI 40.98 kg/m2    UOP: None since OR    No acute distress, interactive  Non labored respirations on room air  Regular rate, regular rhythm  Abdomen soft, appropriately tender, non distended  Incision with sterile dressing in place, minimal spotting  Extremities warm, well perfused  Alert, oriented      A/P: 21 year old female POD#0 s/p lap sleeve gastrectomy. No acute issues at this time.    - Pain control with PCA dilaudid; ice pack as needed  - HDS, monitor vitals per protocol  - Encourage incentive spirometry  - IVF  - NPO + liquid medications only tonight  - PRN anti-nausea, anti-emetics  - SCDs; Lovenox to start tomorrow morning  - No antibiotics    Dispo: 7B with discharge tomorrow likely pending diet tolerance, pain control      - - - - - - - - - - - -  Franko Becker MD  PGY-1, General Surgery  [facility]  Pager: [phone]    "
MINIMALLY INVASIVE SURGERY PROGRESS NOTE  3/15/2017    Patient: Agatha Alejandro  MRN: [id number]    Subjective  No acute overnight events. Patient states she had an OK night, some nausea early on which is now improved. Voiding spontaneously. Ambulating.      Objective  Temp:  [97.3  F (36.3  C)-98.5  F (36.9  C)] 97.3  F (36.3  C)  Heart Rate:  [56-92] 57  Resp:  [14-18] 16  BP: (114-147)/() 126/64  SpO2:  [89 %-100 %] 98 %    General: AAOx4, NAD, lying comfortably in bed  CV: regular rate, warm, well-perfused  Pulm: no dyspnea, breathing comfortably on RA  Abd: soft, non-distended, appropriately tender to palpation; incisions with dressings intact, minimal spotting  Extremities: no edema  Neuro: moving all extremities spontaneously without apparent deficit      Intake/Output Summary (Last 24 hours) at 03/15/17 0718  Last data filed at 03/15/17 0600   Gross per 24 hour   Intake             2310 ml   Output              600 ml   Net             1710 ml       Labs:  No new labs    Imaging:  No new imaging      Assessment & Plan  Agatha Alejandro is a 21 year old female with a h/o morbid obesity who is now POD #1 s/p lap sleeve gastrectomy, doing well.    Neuro:      -Pain: switch PCA to liquid oxy  CV: HDS, continue monitoring vitals per protocol  Pulm: wean O2 as able     -encourage IS  FEN/GI:      -IVF: discontinue     -Diet: bariatric clear liquids (1 cc/hr x 2 hrs; then advance to 2 cc/hr thereafter if tolerated)     -Nausea control: PRN  Renal: UOPA, voiding spontaneously  ID: afebrile, no leukocytosis, no abx  PPx: OOB, IS, SCDs, Lovenox to start this AM    Dispo: floor, discharge to home pending diet tolerance, pain control with oral meds      Patient seen and discussed with upper level resident and will be discussed with staff.      - - - - - - - - - - - -  Franko Becker MD  PGY-1, General Surgery  [facility]  Pager: [phone]  
Nutrition Education Consult:    Nutrition consult received for evaluation and education post-bariatric surgery. Pt already received bariatric clear and low-fat full liquid diet education at pre-op clinic appointment. Will follow-up with further diet progression education at Pt's 1 week post-op clinic appointment or prn.     Amelia Maguire, LILIAN, LD   [address] Pager: [phone]   
Opt out

## (undated) DEVICE — DRSG PRIMAPORE 02X3" 7133

## (undated) DEVICE — STPL ENDO HANDLE GIA ULTRA UNIVERSAL XLONG EGIAUXL

## (undated) DEVICE — STPL ENDO RELOAD 60MM MEDIUM THICK PURPLE EGIA60AMT

## (undated) DEVICE — ENDO POUCH 5X9" 15MM ENDOCATCH II 173049

## (undated) DEVICE — NDL INSUFFLATION 150MM VERRES 172016

## (undated) DEVICE — ENDO TROCAR 05MM VERSAONE BLADELESS W/STD FIX CAN NONB5STF

## (undated) DEVICE — STPL SKIN 35W 059037

## (undated) DEVICE — ESU LIGASURE MARYLAND JAW OPEN SEALER/DVDR 5MMX37CM LF1737

## (undated) DEVICE — ENDO TROCAR 15MM VERSAONE BLADELESS W/STD FIX CAN NONB15STF

## (undated) DEVICE — LINEN TOWEL PACK X6 WHITE 5487

## (undated) DEVICE — PREP CHLORAPREP 26ML TINTED ORANGE  260815

## (undated) DEVICE — SU ENDO STITCH SURGIDAC 2-0 ES-9 7" TRIPLE STITCH 170041

## (undated) DEVICE — Device

## (undated) DEVICE — ENDO CANNULA 05MM VERSAONE UNIVERSAL UNVCA5STF

## (undated) DEVICE — CLIP APPLIER ENDO 05MM MED/LG 176630

## (undated) DEVICE — DEVICE ENDO STITCH APPLIER 10MM 173016

## (undated) DEVICE — LINEN TOWEL PACK X30 5481

## (undated) RX ORDER — CLINDAMYCIN PHOSPHATE 900 MG/50ML
INJECTION, SOLUTION INTRAVENOUS
Status: DISPENSED
Start: 2017-03-14

## (undated) RX ORDER — ONDANSETRON 2 MG/ML
INJECTION INTRAMUSCULAR; INTRAVENOUS
Status: DISPENSED
Start: 2017-03-14

## (undated) RX ORDER — FENTANYL CITRATE 50 UG/ML
INJECTION, SOLUTION INTRAMUSCULAR; INTRAVENOUS
Status: DISPENSED
Start: 2017-03-14

## (undated) RX ORDER — KETOROLAC TROMETHAMINE 30 MG/ML
INJECTION, SOLUTION INTRAMUSCULAR; INTRAVENOUS
Status: DISPENSED
Start: 2017-03-14